# Patient Record
Sex: FEMALE | Race: WHITE | Employment: FULL TIME | URBAN - METROPOLITAN AREA
[De-identification: names, ages, dates, MRNs, and addresses within clinical notes are randomized per-mention and may not be internally consistent; named-entity substitution may affect disease eponyms.]

---

## 2022-06-15 PROBLEM — I48.19 PERSISTENT ATRIAL FIBRILLATION WITH RVR (HCC): Status: ACTIVE | Noted: 2022-06-15

## 2022-06-15 PROBLEM — Q24.9 CONGENITAL HEART DISEASE: Status: ACTIVE | Noted: 2022-06-15

## 2022-06-15 PROBLEM — I10 HTN (HYPERTENSION): Status: ACTIVE | Noted: 2022-06-15

## 2022-06-15 NOTE — PROGRESS NOTES
[address]   Electrophysiology Consultation   Date: 6/16/2022  Reason for Consultation: Congenital Heart, Atrial fibrillation  Consult Requesting Physician: Daisey Paget MD   Chief Complaint   Patient presents with    New Patient    Atrial Fibrillation    Hypertension       CC:  Atrial fibrillation   HPI: Benji Snell is a 52 y.o. female h/o Ebstein anomaly,Open heart surgery x 4,  Cadaver pulmonic valve replacement 1999, tricuspid valve replacement 2009. Referred by PCP for persistent atrial fibrillation. Family history of complex congenital Heart Disease. She is here for a second opinion. Interval History:   Francie Ferguson presents today for symptomatic atrial fibrillation. Flutter does not bother her as much . Per patient she states her groins are occluded. She was told this as a child at [facility]. He was supposed to have an ablation in 2009 at [facility] . Her prior surname is Rj. [id number]. Assessment and plan:   Persistent atrial flutter/possible fibrillation   - EKG today afib/flutter  rate 73    - she has been on most AAD . She had breakthrough episodes    - ekg 06/13/2022 - atrial fibrillation rate 59    - continue Lopressor 25 Mg BID- she takes an  Extra for high rates    - Patient has a [id number] Score of 1  ( gender )     ~ continue Warfarin   - Treatment options including cardioversion, rate control strategy, antiarrhythmics, anticoagulation and possible ablation were discussed with patient. Risks, benefits and alternative of each treatment options were explained. All questions answered   - she may be feeling the difference in rate rather than rhythm . Will have her wear a monitor    -The risks, benefits and alternatives of the ablation procedure were discussed with the patient.  The risks including, but not limited to, the risks of bleeding, infection, radiation exposure, injury to vascular, cardiac and surrounding structures (including pneumothorax), stroke, cardiac perforation, tamponade, need for emergent open heart surgery, need for pacemaker implantation, Injury to the phrenic nerve, injury to the esophagus, myocardial infarction and death were discussed in detail. There is a complicated case. 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 In addition she is very symptomatic with episodes of tachycardia that could be either atrial flutter with RVR or atrial fibrillation. My suspicion is that it is actually atrial flutter with rapid conduction that makes her symptomatic and not atrial fibrillation. I will do a monitor to assess for the type of rhythm that she has. My plan is to proceed with ablation of atrial flutter and may be atrial fibrillation if I find any evidence of it. This has to be done primarily from axis from the neck. However we will again attempt to see if there is any possibility of groin access at the time of procedure. Congential Heart Disease/ Valve replacement   - s/p pulmonic and valve replacement   - Ebstein anomaly     HTN  -Controlled 92/60   -BP goal <130/80  -Home BP monitoring encouraged, printed information provided on how to accurately measure BP at home.  -Counseled to follow a low salt diet to assure blood pressure remains controlled for cardiovascular risk factor modification.   -The patient is counseled to get regular exercise 3-5 times per week and maintain a healthy weight reduce cardiovascular risk factors. ~ lisinopril stopped due to heart rate          Plan:   Repeat monitor to determine rhythm and burden   Ablation of atrial flutter.     Patient Active Problem List    Diagnosis Date Noted    Edema 06/16/2022    Persistent atrial fibrillation with RVR ([address] 06/15/2022    HTN (hypertension) 06/15/2022    Congenital heart disease 06/15/2022     Diagnostic studies:   ECG 6/16/22  AFIB- RBBB rate 73    468 QTcH,  140 QRS    Echo 06/16/2022 pending            I independently reviewed the cardiac diagnostic studies, ECG and relevant imaging studies. No results found for: LVEF, LVEFMODE  No results found for: TSHFT4, TSH    Physical Examination:  Vitals:    06/16/22 1133   BP: 92/60   Pulse: 57   SpO2: 97%      Wt Readings from Last 3 Encounters:   06/16/22 134 lb (60.8 kg)   06/16/22 134 lb 14.4 oz (61.2 kg)       · Constitutional: Oriented. No distress. · Head: Normocephalic and atraumatic. · Mouth/Throat: Oropharynx is clear and moist.   · Eyes: Conjunctivae normal. EOM are normal.   · Neck: Neck supple. No rigidity. No JVD present. · Cardiovascular: Normal rate, regular rhythm, S1&S2. · Pulmonary/Chest: Bilateral respiratory sounds. No wheezes, No rhonchi. · Abdominal: Soft. Bowel sounds present. No distension, No tenderness. · Musculoskeletal: No tenderness. No edema    · Lymphadenopathy: Has no cervical adenopathy. · Neurological: Alert and oriented. Cranial nerve appears intact, No Gross deficit   · Skin: Skin is warm and dry. No rash noted. · Psychiatric: Has a normal behavior       Review of System:  [x] Full ROS obtained and negative except as mentioned in HPI    Prior to Admission medications    Medication Sig Start Date End Date Taking?  Authorizing Provider   metoprolol tartrate (LOPRESSOR) 25 MG tablet Take 25 mg by mouth 2 times daily  6/9/22  Yes Historical Provider, MD   warfarin (COUMADIN) 5 MG tablet  6/9/22  Yes Historical Provider, MD   Multiple Vitamins-Minerals (MULTI ADULT GUMMIES PO) Take by mouth   Yes Historical Provider, MD   Ascorbic Acid (VITAMIN C) 250 MG tablet Take 250 mg by mouth daily   Yes Historical Provider, MD   Cholecalciferol (VITAMIN D3) 125 MCG (5000 UT) TABS Take by mouth   Yes Historical Provider, MD   Coenzyme Q10 (COQ10) 100 MG CAPS Take by mouth   Yes Historical Provider, MD   Dimethicone-Allantoin (Amada Billow) Apply topically   Yes Historical Provider, MD   Lactobacillus (PROBIOTIC ACIDOPHILUS) CAPS Take by mouth   Yes Historical Provider, MD       Past Medical History:   Diagnosis Date    Atrial fibrillation ([address]     Atrial flutter (HCC)     HTN (hypertension)         Past Surgical History:   Procedure Laterality Date    PULMONARY VALVE REPLACEMENT         Allergies   Allergen Reactions    Compazine [Prochlorperazine] Other (See Comments)     Hallucinations     Sulfa Antibiotics Hives       Social History:  Reviewed. reports that she quit smoking about a year ago. Her smoking use included cigarettes. She has a 2.50 pack-year smoking history. She has never used smokeless tobacco. She reports current alcohol use of about 2.0 standard drinks of alcohol per week. She reports that she does not use drugs. Family History:  Reviewed. Reviewed. No family history of SCD. Relevant and available labs, and cardiovascular diagnostics reviewed. Reviewed. I independently reviewed all cardiac tracing. I independently reviewed relevant and available cardiac diagnostic tests ECG, CXR, Echo, Stress test, Device interrogation, Holter, CT scan. Outside medical records via Care everywhere reviewed and summarized in H&P above. Complex medical condition with multiple medical problems affecting prognosis and outcome of EP interventions       - The patient is counseled to follow a low salt diet to assure blood pressure remains controlled for cardiovascular risk factor modification.   - The patient is counseled to avoid excess caffeine, and energy drinks as this may exacerbated ectopy and arrhythmia. - The patient is counseled to get regular exercise 3-5 times per week to control cardiovascular risk factors. All questions and concerns were addressed to the patient/family. Alternatives to my treatment were discussed. I have discussed the above stated plan and the patient verbalized understanding and agreed with the plan. Scribe attestation:  This note was scribed in the

## 2022-06-15 NOTE — PROGRESS NOTES
[address]  6/16/22  Referring: Dr. Lauren Rosales CONSULT/CHIEF COMPLAINT/HPI     Reason for visit/ Chief complaint     Chief Complaint   Patient presents with    New Patient    Heart Problem     Congential        HPI Frank Flaherty is a 52 y.o. seen as a new patient. She has a complex cardiac history with congenital heart disease and 4 open heart surgeries. History of pulmonic valve w/ cadaver, replaced in 1999, tricuspid mechanical valve in 2009. She has chronic atrial fibrillation and has been cardioverted in past.     She also has an appt with Dr Marichuy Brian today. She states she has tried multiple anti-arrhythmic agents in the past. She states she has been in atrial fibrillation/flutter most of the time now. She states she feels bad when her heart rate is fast. Recently she has been taking extra Metoprolol for elevated HR. She has chest pain when heart is racing. Thien Kc denies MILLS, dizziness. She has edema in her LE's and abdomen. She has noted recently her belly is a lot more swollen and a lot more fatigue. This has gotten worse over the last month. Patient is adherent with medications and is tolerating them well without side effects     HISTORY/ALLERGIES/ROS     MedHx:  has a past medical history of Atrial fibrillation ([address] Atrial flutter ([address] and HTN (hypertension). SurgHx:  has a past surgical history that includes pulmonary valve replacement. SocHx:  reports that she quit smoking about a year ago. Her smoking use included cigarettes. She has a 2.50 pack-year smoking history. She has never used smokeless tobacco. She reports current alcohol use of about 2.0 standard drinks of alcohol per week. She reports that she does not use drugs.    FamHx: No family history of premature coronary artery disease, sudden death, or aneurysm  Allergies: Compazine [prochlorperazine] and Sulfa antibiotics     MEDICATIONS      Prior to Admission medications    Medication Sig Start Date End Date Taking? Authorizing Provider   cephALEXin (KEFLEX) 500 MG capsule  5/24/22  Yes Historical Provider, MD   lisinopril (PRINIVIL;ZESTRIL) 2.5 MG tablet  6/9/22  Yes Historical Provider, MD   metoprolol tartrate (LOPRESSOR) 25 MG tablet  6/9/22  Yes Historical Provider, MD   warfarin (COUMADIN) 5 MG tablet  6/9/22  Yes Historical Provider, MD       PHYSICAL EXAM        Vitals:    06/16/22 1034   BP: 92/60   Pulse: 57   Resp: 20   SpO2: 97%    Weight: 134 lb 14.4 oz (61.2 kg)     Gen Alert, cooperative, no distress Heart  Regular rate and rhythm, mechanical S1, fixed split S2, soft diastolic murmur   Head Normocephalic, atraumatic, no abnormalities Abd  Soft, NT, +BS, no mass, no OM. Mild abdominal distention, mild hepatomegaly, tender to palpation. Eyes PERRLA, conj/corn clear Ext  Ext nl, AT, no C/C, trace edema   Nose Nares normal, no drain age, Non-tender Pulse 2+ and symmetric   Throat Lips, mucosa, tongue normal Skin Color/text/turg nl, no rash/lesions   Neck S/S, TM, NT, no bruit. Moderate JVD. Psych Nl mood and affect   Lung CTA-B, unlabored, no DTP     Ch wall NT, no deform       LABS and Imaging     Relevant and available CV data reviewed    EKG personally interpreted: 5/25 Atrial fib BBB    Echo - new echo done today, personally reviewed/read by me, conclusions below:    Normal left ventricle size, wall thickness, and systolic function with an estimated ejection fraction of 50%. No regional wall motion  abnormalities are seen. Abnormal septal motion with sudden severe flattening of septum at start of diastole followed by a septal 'bounce'  The right ventricle is severely enlarged. Right ventricular systolic function is mildly reduced. Mild mitral regurgitation. The native tricuspid valve is apically displaced with no leaflet coaptation consistent with Ebstein's anomaly.   There is a mechanical tricuspid valve replacement at the same level of the mitral valve with a peak velocity of 2.5 m/s and a the near future, although it appears to have only moderate NH; however her RV is very large and the NH may be underestimated; regardless the valve is very old. She could have a percutaneous replacement via a hybrid approach, but more than likely will need redo sternotomy to replace both valves. She clinically has significant right heart failure with mild ascites, NYHA II, needs labs to evaluate for liver disease. These were ordered, as well as a BNP and other labs. 2.Tachycardia   Plan: per EP    3. PAF/flutter   Plan: check labs for BNP, CMP, CBC, and TSH. Per EP    4. Edema   Plan: HR is already fairly low and she is already on beta blocker to help lower TVR gradient. Will hold off on diuretics pending BNP results since her BP is already low. Patient counseled on lifestyle modification, diet, and exercise. Follow Up: 1 month     Lonnie Hay MD  Cardiologist [facility]    Scribe's attestation: This note was scribed in the presence of Dr Gilberto Cullen MD by Custer Bosworth, RN. Physician Attestation  The scribe wrote this note in the presence of me Lonnie Hay MD). The scribe may have prepopulated components of this note with my historical  intellectual property under my direct supervision. Any additions to this intellectual property were performed in my presence and at my direction. Furthermore, the content and accuracy of this note have been reviewed by me with edits by me as needed.   Lonnie Hay MD 6/16/2022 4:41 PM

## 2022-06-16 ENCOUNTER — OFFICE VISIT (OUTPATIENT)
Dept: CARDIOLOGY CLINIC | Age: 47
End: 2022-06-16
Payer: COMMERCIAL

## 2022-06-16 ENCOUNTER — HOSPITAL ENCOUNTER (OUTPATIENT)
Dept: NON INVASIVE DIAGNOSTICS | Age: 47
Discharge: HOME | End: 2022-06-16
Payer: COMMERCIAL

## 2022-06-16 VITALS
SYSTOLIC BLOOD PRESSURE: 92 MMHG | OXYGEN SATURATION: 97 % | HEART RATE: 57 BPM | DIASTOLIC BLOOD PRESSURE: 60 MMHG | HEIGHT: 61 IN | BODY MASS INDEX: 25.47 KG/M2 | WEIGHT: 134.9 LBS | RESPIRATION RATE: 20 BRPM

## 2022-06-16 VITALS
SYSTOLIC BLOOD PRESSURE: 92 MMHG | HEART RATE: 57 BPM | HEIGHT: 62 IN | WEIGHT: 134 LBS | DIASTOLIC BLOOD PRESSURE: 60 MMHG | OXYGEN SATURATION: 97 % | BODY MASS INDEX: 24.66 KG/M2

## 2022-06-16 DIAGNOSIS — I36.0 NONRHEUMATIC TRICUSPID VALVE STENOSIS: ICD-10-CM

## 2022-06-16 DIAGNOSIS — I48.19 PERSISTENT ATRIAL FIBRILLATION WITH RVR (HCC): Primary | ICD-10-CM

## 2022-06-16 DIAGNOSIS — I48.19 PERSISTENT ATRIAL FIBRILLATION WITH RVR (HCC): ICD-10-CM

## 2022-06-16 DIAGNOSIS — R60.9 EDEMA, UNSPECIFIED TYPE: ICD-10-CM

## 2022-06-16 DIAGNOSIS — I48.3 TYPICAL ATRIAL FLUTTER (HCC): ICD-10-CM

## 2022-06-16 DIAGNOSIS — Q24.9 CONGENITAL HEART DISEASE: ICD-10-CM

## 2022-06-16 DIAGNOSIS — I10 HYPERTENSION, UNSPECIFIED TYPE: ICD-10-CM

## 2022-06-16 DIAGNOSIS — Q24.9 CONGENITAL HEART DISEASE: Primary | ICD-10-CM

## 2022-06-16 DIAGNOSIS — E78.5 HYPERLIPIDEMIA, UNSPECIFIED HYPERLIPIDEMIA TYPE: ICD-10-CM

## 2022-06-16 LAB
LV EF: 50 %
LVEF MODALITY: NORMAL

## 2022-06-16 PROCEDURE — 93000 ELECTROCARDIOGRAM COMPLETE: CPT | Performed by: INTERNAL MEDICINE

## 2022-06-16 PROCEDURE — 93306 TTE W/DOPPLER COMPLETE: CPT

## 2022-06-16 PROCEDURE — 93246 EXT ECG>7D<15D RECORDING: CPT | Performed by: INTERNAL MEDICINE

## 2022-06-16 PROCEDURE — 99215 OFFICE O/P EST HI 40 MIN: CPT | Performed by: INTERNAL MEDICINE

## 2022-06-16 PROCEDURE — 99205 OFFICE O/P NEW HI 60 MIN: CPT | Performed by: INTERNAL MEDICINE

## 2022-06-16 RX ORDER — METOPROLOL TARTRATE 50 MG/1
50 TABLET, FILM COATED ORAL 2 TIMES DAILY
COMMUNITY
Start: 2022-06-09 | End: 2022-08-15 | Stop reason: SDUPTHER

## 2022-06-16 RX ORDER — CEPHALEXIN 500 MG/1
CAPSULE ORAL
COMMUNITY
Start: 2022-05-24 | End: 2022-06-16 | Stop reason: ALTCHOICE

## 2022-06-16 RX ORDER — MULTIVIT WITH MINERALS/LUTEIN
250 TABLET ORAL DAILY
COMMUNITY

## 2022-06-16 RX ORDER — CHOLECALCIFEROL (VITAMIN D3) 125 MCG
CAPSULE ORAL
COMMUNITY

## 2022-06-16 RX ORDER — LISINOPRIL 2.5 MG/1
TABLET ORAL
COMMUNITY
Start: 2022-06-09 | End: 2022-06-16

## 2022-06-16 RX ORDER — VITAMIN B COMPLEX
TABLET ORAL
COMMUNITY

## 2022-06-16 RX ORDER — WARFARIN SODIUM 5 MG/1
TABLET ORAL
COMMUNITY
Start: 2022-06-09

## 2022-06-16 NOTE — LETTER
[address]  EP Procedure Sheet    6/16/22  Riley Wallace  1975  EP Procedures  [] Pacemaker implant (single/dual) [] EP Study   [] ICD implant (single/dual) [x] Atrial flutter ablation (MARY Y/N)   [] Biv implant ICD [] Tilt Table   [] Biv implant PPM [x] Atrial fibrillation ablation (MARY Yes)  Neck access    [] Generator Change (PPM/ICD/BiV) [] SVT ablation   [] Lead revision (RV/LA/RA) (<1 month) [] PVC ablation     [] Lead extraction +/- upgrade (BiV/PPM/ICD) [] VT Ischemic/ non-ischemic   [] Loop implant/ removal [] VT RVOT   [] Cardioversion [] VT Left sided   [] MARY [] AVN ablation   Equipment  [] Medtronic  [] [employer] Mapping System   [] [place] [] [address]   [] [employer] [] CryoAblation   [] Biotronik [] Laser Lead Extraction   EP Procedures Scheduling Request  # hours Requested  []1 []2 []2-4 [x] 4-6 Scheduled  Date:   Specific Day  Completed    Anesthesia [x]yes []no F/u Date:   CT surgery backup []yes []no COVID     Overnight stay      Performing MD  []RMM [x]MXA   []MKW [] Other _______ First vs repeat  []1st [x] 2nd [] 3rd   Pre-Procedure Labs / Imaging  [] PT/INR [] Type & cross   [] CBC [] Units PRBC   [] BMP/Mg [] Units FFP   [] Venogram [] Cardiac CTA for Pulmonary vein mapping     RN INITIALS: DW    Patient Instructions  Do not eat or drink after midnight the night prior to procedure  Dx:persistent atrial fib/ flutter  ICD-10 code:  I48.2, i48.92    Medication Instructions:  Warfarin

## 2022-06-23 ENCOUNTER — TELEPHONE (OUTPATIENT)
Dept: CARDIOLOGY CLINIC | Age: 47
End: 2022-06-23

## 2022-06-23 NOTE — TELEPHONE ENCOUNTER
Millie Marmolejo called and wanted to [facility] know that [facility] Dr. Marco Antonio Cordova with [facility] regarding her condition and the cone procedure and to let him know that Dr. Marco Antonio Cordova will be calling him to discuss her medical history. She advised if any questions, to please call her.

## 2022-06-23 NOTE — TELEPHONE ENCOUNTER
Wonderful! That would be terrific if Dr. Jesica Lozada could do that on her. Thanks for letting me know.

## 2022-06-23 NOTE — TELEPHONE ENCOUNTER
Someone should be able to send the echo films via nucleus. I think [facility] uses Epic/Care Everywhere so the progress note and my echo report are both in [employer].

## 2022-06-23 NOTE — TELEPHONE ENCOUNTER
The person that pushes echos through is gone for the day and I am off on Friday. I wanted to forward to you so this does not get forgotten. Thanks!

## 2022-06-23 NOTE — TELEPHONE ENCOUNTER
All information faxed and received with confirmation  Received. Will check to see if they could still send a link for future purposes. Office is closed, will reach out first thing in the morning.

## 2022-06-23 NOTE — TELEPHONE ENCOUNTER
I have more information on [address]. Dr. Wilmer Conteh NP Thersia London called requesting Keke's last [address] notes with ANGIE. She also advised that if he would like to speak with her prior to talking to Dr. Laz Cotto she can be reached at [phone]. She asked that the Echo & OV notes be faxed to [phone]. She also mentioned that they usually send/receive records, test, etc electronically. The also use a system called [employer] and she can also provide a link to send the documents.

## 2022-06-24 NOTE — TELEPHONE ENCOUNTER
Call placed to [facility] who was not available for conversation. Needing to make sure that all information again was received and if there is anything they are needing from Dr Sarita Pagan. Lmor to call the office.

## 2022-06-28 NOTE — TELEPHONE ENCOUNTER
Will hold for Mrs Penny Keith or and Dr Aarti Mar. Spoke with Senia Stauffer in Echo she will make a copy of the disc. Holding for discussion.

## 2022-06-28 NOTE — TELEPHONE ENCOUNTER
Will speak with you about this in the morning. I do have the disk. They do not use the same system that we use. I have a link to send the images to. Prema stated that we cannot use the link it is a violation of the Hospital. I did speak with Abdiel Zafar and informed her that. She will overnight UPS packing to ship out the disc from [facility]. I will inform you more in the morning.

## 2022-06-28 NOTE — TELEPHONE ENCOUNTER
Ricky do they just want the disk sent or can they receive images through the cloud? If they would like it sent please see if they have  service and want to pick it up that way or if they want us to send it but it might take awhile.  Thank you

## 2022-06-28 NOTE — TELEPHONE ENCOUNTER
Latonya Mccollum, NP returning [employer] call, [facility], [place], [phone]. She's trying to get records on Pt.    Please advise

## 2022-07-07 ENCOUNTER — HOSPITAL ENCOUNTER (OUTPATIENT)
Dept: CARDIAC CATH/INVASIVE PROCEDURES | Age: 47
Discharge: HOME | End: 2022-07-07
Attending: INTERNAL MEDICINE | Admitting: INTERNAL MEDICINE
Payer: COMMERCIAL

## 2022-07-07 VITALS
SYSTOLIC BLOOD PRESSURE: 135 MMHG | BODY MASS INDEX: 25.3 KG/M2 | WEIGHT: 134 LBS | RESPIRATION RATE: 16 BRPM | HEIGHT: 61 IN | HEART RATE: 65 BPM | DIASTOLIC BLOOD PRESSURE: 81 MMHG | OXYGEN SATURATION: 97 %

## 2022-07-07 LAB
INR BLD: 2.8 (ref 0.88–1.12)
LV EF: 50 %
LVEF MODALITY: NORMAL

## 2022-07-07 PROCEDURE — 99153 MOD SED SAME PHYS/QHP EA: CPT

## 2022-07-07 PROCEDURE — 85610 PROTHROMBIN TIME: CPT

## 2022-07-07 PROCEDURE — 99152 MOD SED SAME PHYS/QHP 5/>YRS: CPT

## 2022-07-07 PROCEDURE — 93312 ECHO TRANSESOPHAGEAL: CPT

## 2022-07-07 PROCEDURE — 93325 DOPPLER ECHO COLOR FLOW MAPG: CPT

## 2022-07-07 PROCEDURE — 93320 DOPPLER ECHO COMPLETE: CPT

## 2022-07-07 PROCEDURE — 99152 MOD SED SAME PHYS/QHP 5/>YRS: CPT | Performed by: INTERNAL MEDICINE

## 2022-07-07 PROCEDURE — 7100000010 HC PHASE II RECOVERY - FIRST 15 MIN

## 2022-07-07 RX ORDER — SODIUM CHLORIDE 0.9 % (FLUSH) 0.9 %
5-40 SYRINGE (ML) INJECTION PRN
Status: DISCONTINUED | OUTPATIENT
Start: 2022-07-07 | End: 2022-07-07 | Stop reason: HOSPADM

## 2022-07-07 NOTE — PROGRESS NOTES
Brief immediate postprocedure note       MARY performed w/o complications      Anesthesia: 1% viscous lidocaine, cetacaine spray   Sedation: 4 mg IV versed   Analgesia: 100 mcg IV fentanyl     Probe inserted easily and removed easily   Findings: Mechanical tricuspid valve hard to see due to shadowing; severe turbulence through valve, although mean gradient 5-6. One leaflet moved well, other leaflet did not appear to move normally but was not well seen. No obvious thrombus noted. Severe \"smoke\" in right atrium, no atrial contractility. Negative bubble study      Complications: none   EBL: none   Specimens: N/A       CONSCIOUS SEDATION: Conscious sedation was given. The medication documented above was administered during the study. There was a trained observer present throughout the   entire time during which sedation was administered. Moderate sedation time   was 25 minutes.

## 2022-07-07 NOTE — PRE SEDATION
Sedation Pre-Procedure Note    Patient Name: Brannon Pelayo   YOB: 1975  Room/Bed: Cath/NONE  Medical Record Number: [id number]  Date: 7/7/2022   Time: 10:55 AM       Indication:  MARY    Consent: I have discussed with the patient and/or the patient representative the indication, alternatives, and the possible risks and/or complications of the planned procedure and the anesthesia methods. The patient and/or patient representative appear to understand and agree to proceed. Vital Signs:   Vitals:    07/07/22 1030   BP: 135/81   Pulse: 65   Resp: 16   SpO2: 97%       Past Medical History:   has a past medical history of Atrial fibrillation ([facility] Atrial flutter ([facility] and HTN (hypertension). Past Surgical History:   has a past surgical history that includes pulmonary valve replacement. Medications:   Scheduled Meds:   Continuous Infusions:   PRN Meds:   Home Meds:   Prior to Admission medications    Medication Sig Start Date End Date Taking?  Authorizing Provider   metoprolol tartrate (LOPRESSOR) 25 MG tablet Take 25 mg by mouth 2 times daily  6/9/22   Historical Provider, MD   warfarin (COUMADIN) 5 MG tablet  6/9/22   Historical Provider, MD   Multiple Vitamins-Minerals (MULTI ADULT GUMMIES PO) Take by mouth    Historical Provider, MD   Ascorbic Acid (VITAMIN C) 250 MG tablet Take 250 mg by mouth daily    Historical Provider, MD   Cholecalciferol (VITAMIN D3) 125 MCG (5000 UT) TABS Take by mouth    Historical Provider, MD   Coenzyme Q10 (COQ10) 100 MG CAPS Take by mouth    Historical Provider, MD   Dimethicone-Allantoin (COLLIGINIX EX) Apply topically    Historical Provider, MD   Lactobacillus (PROBIOTIC ACIDOPHILUS) CAPS Take by mouth    Historical Provider, MD     Coumadin Use Last 7 Days:  yes -   Antiplatelet drug therapy use last 7 days: no  Other anticoagulant use last 7 days: no  Additional Medication Information:  See above      Pre-Sedation Documentation and Exam:   I have personally completed a history, physical exam & review of systems for this patient (see notes).     Mallampati Airway Assessment:  normal    Prior History of Anesthesia Complications:   none    ASA Classification:  Class 2 - A normal healthy patient with mild systemic disease    Sedation/ Anesthesia Plan:   intravenous sedation    Medications Planned:   midazolam (Versed) intravenously and fentanyl intravenously    Patient is an appropriate candidate for plan of sedation: yes    Electronically signed by Clive Cruz MD on 7/7/2022 at 10:55 AM

## 2022-07-11 ENCOUNTER — TELEPHONE (OUTPATIENT)
Dept: CARDIOLOGY CLINIC | Age: 47
End: 2022-07-11

## 2022-07-11 PROCEDURE — 93248 EXT ECG>7D<15D REV&INTERPJ: CPT | Performed by: INTERNAL MEDICINE

## 2022-07-11 NOTE — TELEPHONE ENCOUNTER
Pt is calling to check on the her echo images to be sent to:   [address]  Fax:  [phone]  Attn:  Enmanuel Sloan    Please advise

## 2022-07-11 NOTE — TELEPHONE ENCOUNTER
Deloris with [facility] called asking to speak with Ricky. Please call her back at [phone] asap. Thank you.

## 2022-07-11 NOTE — TELEPHONE ENCOUNTER
The echo department is already working on this and I believe they have sent a disc to [facility]. Last I checked there wasn't any way to electronically send the images there.     Davi please confirm the address

## 2022-07-11 NOTE — TELEPHONE ENCOUNTER
Elizabeth Figueroa called me and asked if I could let Dr. Chapito Ivy know that she is tentatively scheduled for 7-20 for Surgery with [facility] in [place]. The are in need of the results of the Echo/MARY asap. Per another encounter, Mezzobit from [facility] also called requesting the results be sent.

## 2022-07-13 NOTE — TELEPHONE ENCOUNTER
All information of emails and link was given to Leydi who stated that she will speak with Cath lab and give then the link to send it.

## 2022-07-13 NOTE — TELEPHONE ENCOUNTER
Called  [facility] to speak with Hollice Fleischer [phone]. Left message with her asking her if she can call us to let us know if they have received the disc with echos.

## 2022-07-14 ENCOUNTER — TELEPHONE (OUTPATIENT)
Dept: CARDIOLOGY CLINIC | Age: 47
End: 2022-07-14

## 2022-07-14 NOTE — TELEPHONE ENCOUNTER
Patient's echo sent to [address] [place] [address] through [employer] overnight. Will email Leno Jain to let her know it has been sent.

## 2022-07-14 NOTE — TELEPHONE ENCOUNTER
Disc found in Dr Hermila Grayson folder. Overnighted to [facility] per MISAEL Holley.

## 2022-08-03 NOTE — PROGRESS NOTES
[address]  8/15/22  Referring: Dr. Pino Hogan CONSULT/CHIEF COMPLAINT/HPI     Reason for visit/ Chief complaint     Congenital heart disease       HPI Leonor Reyez is a 52 y.o. female here for 1 month follow up. She has a complex cardiac history with congenital heart disease and now 5 open heart surgeries. History of pulmonic valve w/ cadaver, replaced in 1999, tricuspid mechanical valve in 2009. She has chronic atrial fibrillation and has been cardioverted in past.     She is most recently s/p re-do sternotomy by Dr. Teresita Murray at [facility] with removal of thrombosed mechanical valve and successful repair of native Ebstein's valve via the [facility] repair. This was done about a month ago. She had a small residual pericardial effusion on last echo. Today she still is having palpitations. She has an appointment with Dr Tiara Mederos for an ablation 9/14/22. She continues on Coumadin. She had a flutter ablation during her surgery at [facility] but was found to be in flutter post-op with an adenosine challenge. Patient is adherent with medications and is tolerating them well without side effects     HISTORY/ALLERGIES/ROS     MedHx:  has a past medical history of Atrial fibrillation ([address] and Atrial flutter ([address] SurgHx:  has a past surgical history that includes pulmonary valve replacement and pulmonary valve repair. SocHx:  reports that she quit smoking about 13 months ago. Her smoking use included cigarettes. She has a 2.50 pack-year smoking history. She has never used smokeless tobacco. She reports current alcohol use of about 2.0 standard drinks per week. She reports that she does not use drugs. FamHx: No family history of premature coronary artery disease, sudden death, or aneurysm  Allergies: Compazine [prochlorperazine], Sulfa antibiotics, and Quinidine sulfate     MEDICATIONS      Prior to Admission medications    Medication Sig Start Date End Date Taking?  Authorizing Provider   oxyCODONE 5 MG capsule Take 5 mg by mouth every 4 hours as needed for Pain. Yes Historical Provider, MD   famotidine (PEPCID) 20 MG tablet Take 20 mg by mouth in the morning and 20 mg before bedtime. Yes Historical Provider, MD   colchicine (COLCRYS) 0.6 MG tablet Take 0.6 mg by mouth in the morning. Yes Historical Provider, MD   acetaminophen (TYLENOL) 500 MG tablet Take 1,000 mg by mouth every 6 hours as needed for Pain   Yes Historical Provider, MD   metoprolol tartrate (LOPRESSOR) 50 MG tablet Take 1 tablet by mouth in the morning and 1 tablet before bedtime. 8/15/22  Yes Valencia Johnson MD   diazePAM (VALIUM) 2 MG tablet Take 1 mg by mouth every 6 hours as needed for Anxiety. Yes Historical Provider, MD   warfarin (COUMADIN) 5 MG tablet  6/9/22  Yes Historical Provider, MD   Multiple Vitamins-Minerals (MULTI ADULT GUMMIES PO) Take by mouth   Yes Historical Provider, MD   Ascorbic Acid (VITAMIN C) 250 MG tablet Take 250 mg by mouth daily   Yes Historical Provider, MD   Cholecalciferol (VITAMIN D3) 125 MCG (5000 UT) TABS Take by mouth   Yes Historical Provider, MD   Lactobacillus (PROBIOTIC ACIDOPHILUS) CAPS Take by mouth   Yes Historical Provider, MD   Coenzyme Q10 (COQ10) 100 MG CAPS Take by mouth  Patient not taking: Reported on 8/15/2022    Historical Provider, MD   Dimethicone-Allantoin (Pipo Medici) Apply topically    Historical Provider, MD       PHYSICAL EXAM        Vitals:    08/15/22 0939   BP: 100/62   Pulse: 85   SpO2: 98%      Weight: 126 lb 6.4 oz (57.3 kg)     Gen Alert, cooperative, no distress Heart  Regular rate and rhythm, normal S1 S2, no murmur   Head Normocephalic, atraumatic, no abnormalities Abd  Soft, NT, +BS, no mass, no OM. Mild abdominal distention, mild hepatomegaly, tender to palpation.    Eyes PERRLA, conj/corn clear Ext  Ext nl, AT, no C/C, trace edema   Nose Nares normal, no drain age, Non-tender Pulse 2+ and symmetric   Throat Lips, mucosa, tongue normal Skin Color/text/turg nl, no rash/lesions   Neck S/S, TM, NT, no bruit. Moderate JVD. Psych Nl mood and affect   Lung CTA-B, unlabored, no DTP     Ch wall NT, no deform. Well-healing median sternotomy scar. LABS and Imaging     Relevant and available CV data reviewed    EKG personally interpreted: 5/25 Atrial fib BBB    MARY 7/7/22   Probable thrombosis of mechanical On-X tricuspid valve. Only one disc is   seen, and the disc seen has reduced motion. Severe tricuspid stenosis by Doppler   Markedly dilated RA with spontaneous contrast.   RV severely enlarged with mildly reduced function. Pulmonary homograft not well seen, but suspect severe WV given degree of RV   dilation. Left-sided chambers normal in size and function. Echo - 7/7/22, personally reviewed/read by me, conclusions below:  Normal left ventricle size, wall thickness, and systolic function with an estimated ejection fraction of 50%. No regional wall motion  abnormalities are seen. Abnormal septal motion with sudden severe flattening of septum at start of diastole followed by a septal 'bounce'  The right ventricle is severely enlarged. Right ventricular systolic function is mildly reduced. Mild mitral regurgitation. The native tricuspid valve is apically displaced with no leaflet coaptation consistent with Ebstein's anomaly. There is a mechanical tricuspid valve replacement at the same level of the mitral valve with a peak velocity of 2.5 m/s and a mean  gradient of 15 mmHg consistent with severe prosthetic valve stenosis. There are increased velocities through the TVR suggestive of prosthetic thickenening vs pannus. Mild tricuspid regurgitation with a PASP of 34 mmHg. s/p homograft PVR with no significant PS and at least moderate pulmonic regurgitation. Possible thrombus vs artifact visualized in the right atrium.   Rhythm during study: atrial flutter  Recommend MARY to evaluate mechanism of TVR stenosis in more detail and to rule out RA thrombus. Consider cardiac MR or CT to  further quantitate pulmonary valve regurgitation given that the homograft is > 21years old and degree of LA is likely underestimated  given size of RV. Stress: none    Cath: unknown    14 Day Holter: 100% Atrial fibrillation/ Flutter, rate is controlled, 1 NSVT 3 beats, symptoms with Afib rate controlled    Outside/Care everywhere records Reviewed  Labs Reviewed  Prior Imaging, ekg, cath, echo reviewed when available  Medications reviewed  Old Notes reviewed  ASSESSMENT AND PLAN     1. Congenital heart disease, Ebstein's, s/p mechanical TR and homograft PVR, now s/p mechanical valve explant, [facility] procedure and re-do PVR at [facility] in 7/2022. Plan:   Repeat echo to reevaluate her new valve repair and residual effusion  Continue metoprolol  Continue coumadin; no longer needs coumadin for her valve, but instead just for her fib/flutter so INR target should be lower 2-3  Has scheduled AF/AFl ablation via neck access with Dr. Fredo Pérez 9/14    After ablation may potentially be able to come off Coumadin now that her valve has been fixed    2. PAF/flutter   Plan: 9/14/22 0800 scheduled for ablation with Dr Amber Arreaga    3. Edema   Improving since surgery    Patient counseled on lifestyle modification, diet, and exercise. Follow Up: 9 months    Suzan Tinoco MD  Cardiologist Paz Espinozae's attestation: Note scribed by Sophia Whittaker RN, in the presence of Dr. Sloan Bourgeois wrote this note in the presence of me Suzan Tinoco MD). The scribe may have prepopulated components of this note with my historical  intellectual property under my direct supervision. Any additions to this intellectual property were performed in my presence and at my direction. Furthermore, the content and accuracy of this note have been reviewed by me with edits by me as needed.   Suzan Tinoco MD 8/15/2022 10:35 AM

## 2022-08-09 NOTE — BRIEF OP NOTE
Brief Postoperative Note      Patient: Karol Willingham  YOB: 1975  MRN: [id number]    Consent obtained prior to procedure. Flouroscopy of the mechanical tricuspid valve was performed. 1 minute of flouro time. Findings bileaflet mechanical valve visualized in multiple projections. It appears that Single leaflet fixed in position and 2nd leaflet has restriction motion. Plan: defer to Dr. Sarita Pagan and who will refer to CT surgery at [facility].       Electronically signed by Dg Santacruz MD on 8/9/2022 at 4:33 PM

## 2022-08-15 ENCOUNTER — HOSPITAL ENCOUNTER (OUTPATIENT)
Dept: NON INVASIVE DIAGNOSTICS | Age: 47
Discharge: HOME | End: 2022-08-15
Payer: COMMERCIAL

## 2022-08-15 ENCOUNTER — OFFICE VISIT (OUTPATIENT)
Dept: CARDIOLOGY CLINIC | Age: 47
End: 2022-08-15
Payer: COMMERCIAL

## 2022-08-15 VITALS
SYSTOLIC BLOOD PRESSURE: 100 MMHG | HEIGHT: 61 IN | OXYGEN SATURATION: 98 % | BODY MASS INDEX: 23.86 KG/M2 | WEIGHT: 126.4 LBS | DIASTOLIC BLOOD PRESSURE: 62 MMHG | HEART RATE: 85 BPM

## 2022-08-15 DIAGNOSIS — Z98.890 S/P TRICUSPID VALVE REPAIR: ICD-10-CM

## 2022-08-15 DIAGNOSIS — I48.3 TYPICAL ATRIAL FLUTTER (HCC): ICD-10-CM

## 2022-08-15 DIAGNOSIS — R00.2 PALPITATIONS: ICD-10-CM

## 2022-08-15 DIAGNOSIS — Q24.9 CONGENITAL HEART DISEASE: Primary | ICD-10-CM

## 2022-08-15 DIAGNOSIS — Q22.5 EBSTEIN ANOMALY: ICD-10-CM

## 2022-08-15 DIAGNOSIS — I48.19 PERSISTENT ATRIAL FIBRILLATION WITH RVR (HCC): ICD-10-CM

## 2022-08-15 DIAGNOSIS — I10 HYPERTENSION, UNSPECIFIED TYPE: ICD-10-CM

## 2022-08-15 PROCEDURE — 93306 TTE W/DOPPLER COMPLETE: CPT

## 2022-08-15 PROCEDURE — 93325 DOPPLER ECHO COLOR FLOW MAPG: CPT

## 2022-08-15 PROCEDURE — 93321 DOPPLER ECHO F-UP/LMTD STD: CPT

## 2022-08-15 PROCEDURE — 99214 OFFICE O/P EST MOD 30 MIN: CPT | Performed by: INTERNAL MEDICINE

## 2022-08-15 PROCEDURE — 93308 TTE F-UP OR LMTD: CPT

## 2022-08-15 PROCEDURE — 93000 ELECTROCARDIOGRAM COMPLETE: CPT | Performed by: INTERNAL MEDICINE

## 2022-08-15 RX ORDER — OXYCODONE HYDROCHLORIDE 5 MG/1
5 CAPSULE ORAL EVERY 4 HOURS PRN
COMMUNITY

## 2022-08-15 RX ORDER — METOPROLOL TARTRATE 50 MG/1
50 TABLET, FILM COATED ORAL 2 TIMES DAILY
Qty: 180 TABLET | Refills: 3 | Status: ON HOLD | OUTPATIENT
Start: 2022-08-15 | End: 2022-09-14 | Stop reason: SDUPTHER

## 2022-08-15 RX ORDER — DIAZEPAM 2 MG/1
1 TABLET ORAL EVERY 6 HOURS PRN
COMMUNITY

## 2022-08-15 RX ORDER — FAMOTIDINE 20 MG/1
20 TABLET, FILM COATED ORAL 2 TIMES DAILY
COMMUNITY

## 2022-08-15 RX ORDER — ACETAMINOPHEN 500 MG
1000 TABLET ORAL EVERY 6 HOURS PRN
COMMUNITY

## 2022-08-15 RX ORDER — COLCHICINE 0.6 MG/1
0.6 TABLET ORAL DAILY
COMMUNITY

## 2022-08-15 NOTE — LETTER
[facility] CARDIOLOGY-[address]  Dept: [phone]  Dept Fax: [phone]      August 15, 2022    Shira Raymond  : 1975  DOS: 8/15/2022    To Whom it May Concern:    Polo Guerrero has been seen in our office today and may return to work on . Please let my office know if you have any questions or concerns.           Rebecca Sabillon MD

## 2022-08-16 ENCOUNTER — TELEPHONE (OUTPATIENT)
Dept: CARDIOLOGY CLINIC | Age: 47
End: 2022-08-16

## 2022-08-16 NOTE — TELEPHONE ENCOUNTER
Vandana Pama works with Dr. Brady Brian in the [facility] is calling to see how the appt went with the Pt. And to check on the echo to see if there's infusion. Tabatha Sharp is requesting for WAK  to call him [phone].   Please advise

## 2022-08-16 NOTE — TELEPHONE ENCOUNTER
Spoke with Frandy MALDONADO whom works for Dr Len Bridges at [facility] and advised him of Dr Satnam Umaña message. I offered to fax the echo results to him that the patient had done here on her OV. He said that would be great. He is going to fax us the records from her procedure.

## 2022-09-13 NOTE — TELEPHONE ENCOUNTER
Dr. Katelyn fraga/[facility] in [facility] [facility] is asking to speak with Dr. Indu Mckee as soon as possible. Please call him on his cell @ [phone]. Thank you.

## 2022-09-14 ENCOUNTER — TELEPHONE (OUTPATIENT)
Dept: CARDIOLOGY CLINIC | Age: 47
End: 2022-09-14

## 2022-09-14 ENCOUNTER — ANESTHESIA EVENT (OUTPATIENT)
Dept: CARDIAC CATH/INVASIVE PROCEDURES | Age: 47
End: 2022-09-14
Payer: COMMERCIAL

## 2022-09-14 ENCOUNTER — HOSPITAL ENCOUNTER (OUTPATIENT)
Dept: CARDIAC CATH/INVASIVE PROCEDURES | Age: 47
Discharge: HOME | End: 2022-09-14
Attending: INTERNAL MEDICINE | Admitting: INTERNAL MEDICINE
Payer: COMMERCIAL

## 2022-09-14 ENCOUNTER — ANESTHESIA (OUTPATIENT)
Dept: CARDIAC CATH/INVASIVE PROCEDURES | Age: 47
End: 2022-09-14
Payer: COMMERCIAL

## 2022-09-14 VITALS
TEMPERATURE: 97 F | HEART RATE: 74 BPM | WEIGHT: 134 LBS | BODY MASS INDEX: 25.3 KG/M2 | DIASTOLIC BLOOD PRESSURE: 49 MMHG | HEIGHT: 61 IN | OXYGEN SATURATION: 98 % | SYSTOLIC BLOOD PRESSURE: 92 MMHG | RESPIRATION RATE: 12 BRPM

## 2022-09-14 DIAGNOSIS — I36.0 NONRHEUMATIC TRICUSPID VALVE STENOSIS: ICD-10-CM

## 2022-09-14 DIAGNOSIS — R06.02 SOB (SHORTNESS OF BREATH): Primary | ICD-10-CM

## 2022-09-14 PROBLEM — I48.4 ATYPICAL ATRIAL FLUTTER (HCC): Status: ACTIVE | Noted: 2022-09-14

## 2022-09-14 LAB
ABO/RH: NORMAL
ANION GAP SERPL CALCULATED.3IONS-SCNC: 7 MMOL/L (ref 3–16)
ANTIBODY SCREEN: NORMAL
BUN BLDV-MCNC: 16 MG/DL (ref 7–20)
CALCIUM SERPL-MCNC: 9.9 MG/DL (ref 8.3–10.6)
CHLORIDE BLD-SCNC: 102 MMOL/L (ref 99–110)
CO2: 28 MMOL/L (ref 21–32)
CREAT SERPL-MCNC: 0.7 MG/DL (ref 0.6–1.1)
EKG ATRIAL RATE: 98 BPM
EKG DIAGNOSIS: NORMAL
EKG P-R INTERVAL: 216 MS
EKG Q-T INTERVAL: 360 MS
EKG QRS DURATION: 160 MS
EKG QTC CALCULATION (BAZETT): 459 MS
EKG R AXIS: 79 DEGREES
EKG T AXIS: 268 DEGREES
EKG VENTRICULAR RATE: 98 BPM
GFR AFRICAN AMERICAN: >60
GFR NON-AFRICAN AMERICAN: >60
GLUCOSE BLD-MCNC: 99 MG/DL (ref 70–99)
HCT VFR BLD CALC: 41.1 % (ref 36–48)
HEMOGLOBIN: 13.9 G/DL (ref 12–16)
LV EF: 33 %
LVEF MODALITY: NORMAL
MCH RBC QN AUTO: 30.6 PG (ref 26–34)
MCHC RBC AUTO-ENTMCNC: 33.9 G/DL (ref 31–36)
MCV RBC AUTO: 90.1 FL (ref 80–100)
PDW BLD-RTO: 14.6 % (ref 12.4–15.4)
PLATELET # BLD: 240 K/UL (ref 135–450)
PMV BLD AUTO: 8 FL (ref 5–10.5)
POTASSIUM SERPL-SCNC: 3.2 MMOL/L (ref 3.5–5.1)
RBC # BLD: 4.56 M/UL (ref 4–5.2)
SODIUM BLD-SCNC: 137 MMOL/L (ref 136–145)
WBC # BLD: 8.1 K/UL (ref 4–11)

## 2022-09-14 PROCEDURE — 93005 ELECTROCARDIOGRAM TRACING: CPT | Performed by: INTERNAL MEDICINE

## 2022-09-14 PROCEDURE — C1732 CATH, EP, DIAG/ABL, 3D/VECT: HCPCS

## 2022-09-14 PROCEDURE — 2580000003 HC RX 258: Performed by: NURSE ANESTHETIST, CERTIFIED REGISTERED

## 2022-09-14 PROCEDURE — 93325 DOPPLER ECHO COLOR FLOW MAPG: CPT

## 2022-09-14 PROCEDURE — 2500000003 HC RX 250 WO HCPCS: Performed by: NURSE ANESTHETIST, CERTIFIED REGISTERED

## 2022-09-14 PROCEDURE — 86850 RBC ANTIBODY SCREEN: CPT

## 2022-09-14 PROCEDURE — 2580000003 HC RX 258

## 2022-09-14 PROCEDURE — C1894 INTRO/SHEATH, NON-LASER: HCPCS

## 2022-09-14 PROCEDURE — 7100000000 HC PACU RECOVERY - FIRST 15 MIN

## 2022-09-14 PROCEDURE — 86901 BLOOD TYPING SEROLOGIC RH(D): CPT

## 2022-09-14 PROCEDURE — 93653 COMPRE EP EVAL TX SVT: CPT | Performed by: INTERNAL MEDICINE

## 2022-09-14 PROCEDURE — 85027 COMPLETE CBC AUTOMATED: CPT

## 2022-09-14 PROCEDURE — 6360000002 HC RX W HCPCS

## 2022-09-14 PROCEDURE — 6360000002 HC RX W HCPCS: Performed by: NURSE ANESTHETIST, CERTIFIED REGISTERED

## 2022-09-14 PROCEDURE — 85610 PROTHROMBIN TIME: CPT

## 2022-09-14 PROCEDURE — C1730 CATH, EP, 19 OR FEW ELECT: HCPCS

## 2022-09-14 PROCEDURE — A4216 STERILE WATER/SALINE, 10 ML: HCPCS

## 2022-09-14 PROCEDURE — 80048 BASIC METABOLIC PNL TOTAL CA: CPT

## 2022-09-14 PROCEDURE — 86900 BLOOD TYPING SEROLOGIC ABO: CPT

## 2022-09-14 PROCEDURE — 93320 DOPPLER ECHO COMPLETE: CPT

## 2022-09-14 PROCEDURE — 3700000001 HC ADD 15 MINUTES (ANESTHESIA)

## 2022-09-14 PROCEDURE — 93315 ECHO TRANSESOPHAGEAL: CPT

## 2022-09-14 PROCEDURE — 93010 ELECTROCARDIOGRAM REPORT: CPT | Performed by: INTERNAL MEDICINE

## 2022-09-14 PROCEDURE — 2709999900 HC NON-CHARGEABLE SUPPLY

## 2022-09-14 PROCEDURE — 93653 COMPRE EP EVAL TX SVT: CPT

## 2022-09-14 PROCEDURE — 3700000000 HC ANESTHESIA ATTENDED CARE

## 2022-09-14 PROCEDURE — 2500000003 HC RX 250 WO HCPCS

## 2022-09-14 PROCEDURE — 7100000001 HC PACU RECOVERY - ADDTL 15 MIN

## 2022-09-14 PROCEDURE — C1769 GUIDE WIRE: HCPCS

## 2022-09-14 PROCEDURE — 36005 INJECTION EXT VENOGRAPHY: CPT | Performed by: INTERNAL MEDICINE

## 2022-09-14 PROCEDURE — 36415 COLL VENOUS BLD VENIPUNCTURE: CPT

## 2022-09-14 RX ORDER — HYDROMORPHONE HCL 110MG/55ML
0.5 PATIENT CONTROLLED ANALGESIA SYRINGE INTRAVENOUS EVERY 5 MIN PRN
Status: DISCONTINUED | OUTPATIENT
Start: 2022-09-14 | End: 2022-09-15 | Stop reason: HOSPADM

## 2022-09-14 RX ORDER — ONDANSETRON 2 MG/ML
4 INJECTION INTRAMUSCULAR; INTRAVENOUS
Status: ACTIVE | OUTPATIENT
Start: 2022-09-14 | End: 2022-09-14

## 2022-09-14 RX ORDER — VANCOMYCIN HYDROCHLORIDE 1 G/20ML
INJECTION, POWDER, LYOPHILIZED, FOR SOLUTION INTRAVENOUS PRN
Status: DISCONTINUED | OUTPATIENT
Start: 2022-09-14 | End: 2022-09-14 | Stop reason: SDUPTHER

## 2022-09-14 RX ORDER — FENTANYL CITRATE 50 UG/ML
INJECTION, SOLUTION INTRAMUSCULAR; INTRAVENOUS PRN
Status: DISCONTINUED | OUTPATIENT
Start: 2022-09-14 | End: 2022-09-14 | Stop reason: SDUPTHER

## 2022-09-14 RX ORDER — SODIUM CHLORIDE 0.9 % (FLUSH) 0.9 %
5-40 SYRINGE (ML) INJECTION PRN
Status: DISCONTINUED | OUTPATIENT
Start: 2022-09-14 | End: 2022-09-15 | Stop reason: HOSPADM

## 2022-09-14 RX ORDER — SODIUM CHLORIDE 0.9 % (FLUSH) 0.9 %
5-40 SYRINGE (ML) INJECTION EVERY 12 HOURS SCHEDULED
Status: DISCONTINUED | OUTPATIENT
Start: 2022-09-14 | End: 2022-09-15 | Stop reason: HOSPADM

## 2022-09-14 RX ORDER — MEPERIDINE HYDROCHLORIDE 25 MG/ML
12.5 INJECTION INTRAMUSCULAR; INTRAVENOUS; SUBCUTANEOUS EVERY 5 MIN PRN
Status: DISCONTINUED | OUTPATIENT
Start: 2022-09-14 | End: 2022-09-15 | Stop reason: HOSPADM

## 2022-09-14 RX ORDER — LABETALOL HYDROCHLORIDE 5 MG/ML
10 INJECTION, SOLUTION INTRAVENOUS
Status: DISCONTINUED | OUTPATIENT
Start: 2022-09-14 | End: 2022-09-15 | Stop reason: HOSPADM

## 2022-09-14 RX ORDER — ACETAMINOPHEN 325 MG/1
650 TABLET ORAL EVERY 4 HOURS PRN
Status: DISCONTINUED | OUTPATIENT
Start: 2022-09-14 | End: 2022-09-15 | Stop reason: HOSPADM

## 2022-09-14 RX ORDER — METOPROLOL TARTRATE 50 MG/1
25 TABLET, FILM COATED ORAL 2 TIMES DAILY
Qty: 180 TABLET | Refills: 3 | Status: SHIPPED | OUTPATIENT
Start: 2022-09-14 | End: 2022-10-10

## 2022-09-14 RX ORDER — SODIUM CHLORIDE 9 MG/ML
INJECTION, SOLUTION INTRAVENOUS PRN
Status: DISCONTINUED | OUTPATIENT
Start: 2022-09-14 | End: 2022-09-15 | Stop reason: HOSPADM

## 2022-09-14 RX ORDER — HYDRALAZINE HYDROCHLORIDE 20 MG/ML
10 INJECTION INTRAMUSCULAR; INTRAVENOUS
Status: DISCONTINUED | OUTPATIENT
Start: 2022-09-14 | End: 2022-09-15 | Stop reason: HOSPADM

## 2022-09-14 RX ORDER — DEXAMETHASONE SODIUM PHOSPHATE 4 MG/ML
INJECTION, SOLUTION INTRA-ARTICULAR; INTRALESIONAL; INTRAMUSCULAR; INTRAVENOUS; SOFT TISSUE PRN
Status: DISCONTINUED | OUTPATIENT
Start: 2022-09-14 | End: 2022-09-14 | Stop reason: SDUPTHER

## 2022-09-14 RX ORDER — SODIUM CHLORIDE 9 MG/ML
INJECTION, SOLUTION INTRAVENOUS CONTINUOUS PRN
Status: DISCONTINUED | OUTPATIENT
Start: 2022-09-14 | End: 2022-09-14 | Stop reason: SDUPTHER

## 2022-09-14 RX ORDER — PROPOFOL 10 MG/ML
INJECTION, EMULSION INTRAVENOUS PRN
Status: DISCONTINUED | OUTPATIENT
Start: 2022-09-14 | End: 2022-09-14 | Stop reason: SDUPTHER

## 2022-09-14 RX ORDER — LIDOCAINE HYDROCHLORIDE 20 MG/ML
INJECTION, SOLUTION EPIDURAL; INFILTRATION; INTRACAUDAL; PERINEURAL PRN
Status: DISCONTINUED | OUTPATIENT
Start: 2022-09-14 | End: 2022-09-14 | Stop reason: SDUPTHER

## 2022-09-14 RX ORDER — ONDANSETRON 2 MG/ML
INJECTION INTRAMUSCULAR; INTRAVENOUS PRN
Status: DISCONTINUED | OUTPATIENT
Start: 2022-09-14 | End: 2022-09-14 | Stop reason: SDUPTHER

## 2022-09-14 RX ORDER — OXYCODONE HYDROCHLORIDE 5 MG/1
5 TABLET ORAL
Status: ACTIVE | OUTPATIENT
Start: 2022-09-14 | End: 2022-09-14

## 2022-09-14 RX ORDER — SUCCINYLCHOLINE CHLORIDE 20 MG/ML
INJECTION INTRAMUSCULAR; INTRAVENOUS PRN
Status: DISCONTINUED | OUTPATIENT
Start: 2022-09-14 | End: 2022-09-14 | Stop reason: SDUPTHER

## 2022-09-14 RX ADMIN — VANCOMYCIN HYDROCHLORIDE 1000 MG: 1 INJECTION, POWDER, LYOPHILIZED, FOR SOLUTION INTRAVENOUS at 08:40

## 2022-09-14 RX ADMIN — LIDOCAINE HYDROCHLORIDE 60 MG: 20 INJECTION, SOLUTION EPIDURAL; INFILTRATION; INTRACAUDAL; PERINEURAL at 08:26

## 2022-09-14 RX ADMIN — SUCCINYLCHOLINE CHLORIDE 120 MG: 20 INJECTION, SOLUTION INTRAMUSCULAR; INTRAVENOUS at 08:26

## 2022-09-14 RX ADMIN — FENTANYL CITRATE 50 MCG: 50 INJECTION, SOLUTION INTRAMUSCULAR; INTRAVENOUS at 11:24

## 2022-09-14 RX ADMIN — PHENYLEPHRINE HYDROCHLORIDE 100 MCG: 10 INJECTION INTRAVENOUS at 08:36

## 2022-09-14 RX ADMIN — PHENYLEPHRINE HYDROCHLORIDE 50 MCG/MIN: 10 INJECTION INTRAVENOUS at 08:36

## 2022-09-14 RX ADMIN — PHENYLEPHRINE HYDROCHLORIDE 100 MCG: 10 INJECTION INTRAVENOUS at 11:21

## 2022-09-14 RX ADMIN — ONDANSETRON 4 MG: 2 INJECTION INTRAMUSCULAR; INTRAVENOUS at 11:41

## 2022-09-14 RX ADMIN — PROPOFOL 120 MG: 10 INJECTION, EMULSION INTRAVENOUS at 08:26

## 2022-09-14 RX ADMIN — PHENYLEPHRINE HYDROCHLORIDE 100 MCG: 10 INJECTION INTRAVENOUS at 08:26

## 2022-09-14 RX ADMIN — DEXAMETHASONE SODIUM PHOSPHATE 8 MG: 4 INJECTION, SOLUTION INTRAMUSCULAR; INTRAVENOUS at 10:30

## 2022-09-14 RX ADMIN — PHENYLEPHRINE HYDROCHLORIDE 100 MCG: 10 INJECTION INTRAVENOUS at 09:46

## 2022-09-14 RX ADMIN — SODIUM CHLORIDE: 9 INJECTION, SOLUTION INTRAVENOUS at 08:10

## 2022-09-14 RX ADMIN — FENTANYL CITRATE 50 MCG: 50 INJECTION, SOLUTION INTRAMUSCULAR; INTRAVENOUS at 08:19

## 2022-09-14 ASSESSMENT — LIFESTYLE VARIABLES: SMOKING_STATUS: 0

## 2022-09-14 NOTE — ANESTHESIA POSTPROCEDURE EVALUATION
Department of Anesthesiology  Postprocedure Note    Patient: Kierra Beth  MRN: [id number]  [place]: 1975  Date of evaluation: 9/14/2022      Procedure Summary     Date: 09/14/22 Room / Location: [facility] Cath Lab; [facility] Echocardiography    Anesthesia Start: 0810 Anesthesia Stop: 1115    Procedure: ECHO/ABLATION WITH ANESTHESIA Diagnosis:       Unspecified atrial flutter      Chronic atrial fibrillation, unspecified    Scheduled Providers:  Responsible Provider: Maddi Alston MD    Anesthesia Type: general ASA Status: 4          Anesthesia Type: No value filed.     Imelda Phase I: Imelda Score: 8    Imelda Phase II:        Anesthesia Post Evaluation    Patient location during evaluation: PACU  Patient participation: complete - patient participated  Level of consciousness: awake and alert  Airway patency: patent  Nausea & Vomiting: no vomiting and no nausea  Complications: no  Cardiovascular status: hemodynamically stable  Respiratory status: acceptable  Hydration status: stable

## 2022-09-14 NOTE — DISCHARGE INSTRUCTIONS
ABLATION DISCHARGE INSTRUCTIONS:    Care of your puncture site:  Remove bandage 24 hours after the procedure. May shower in 24 hours but do not sit in a bathtub/pool of water for 5 days or until the wound is healed. Inspect the site daily and gently clean using soap and water while standing in the shower. Dry thoroughly and apply a Band-Aid that covers the entire site. Do not apply powder or lotion. Normal Observations:  Soreness or tenderness which may last one week. Mild oozing from the incision site. Possible bruising that could last 2 weeks. Activity:  You may resume driving 24 hours following the procedure. You may resume normal activity in 3 days or after the wound heals. Avoid lifting more than 10 pounds for 3 days or until the wound heals. Avoid strenuous exercise or activity for 1 week. Nutrition:  Regular diet  Drink at least 8 to 10 glasses of decaffeinated, non-alcoholic fluid for the next 24 hours to flush the x-ray dye used for your angiogram out of your body. Call your doctor immediately if your condition worsens, for any other concerns, for a follow-up appointment or if you experience any of the following:  Significant bleeding that does not stop after 10 minutes of applying firm pressure on the puncture site. Increased swelling on the groin or leg. Unusual pain, numbness, or tingling of the groin or down the leg. Any signs of infection such as: redness, yellow drainage at the site, swelling or pain.

## 2022-09-14 NOTE — TELEPHONE ENCOUNTER
Patient needs an echo the same day as her appt with berhane in November.  She drives two hours a way and needs it scheduled on same day  thanks

## 2022-09-14 NOTE — ANESTHESIA PRE PROCEDURE
Department of Anesthesiology  Preprocedure Note       Name:  Justin Cote   Age:  52 y.o.  :  1975                                          MRN:  [id number]         Date:  2022      Surgeon: * Surgery not found *    Procedure:     Medications prior to admission:   Prior to Admission medications    Medication Sig Start Date End Date Taking? Authorizing Provider   oxyCODONE 5 MG capsule Take 5 mg by mouth every 4 hours as needed for Pain. Historical Provider, MD   famotidine (PEPCID) 20 MG tablet Take 20 mg by mouth in the morning and 20 mg before bedtime. Historical Provider, MD   colchicine (COLCRYS) 0.6 MG tablet Take 0.6 mg by mouth in the morning. Historical Provider, MD   acetaminophen (TYLENOL) 500 MG tablet Take 1,000 mg by mouth every 6 hours as needed for Pain    Historical Provider, MD   metoprolol tartrate (LOPRESSOR) 50 MG tablet Take 1 tablet by mouth in the morning and 1 tablet before bedtime. 8/15/22   Julio Blanchard MD   diazePAM (VALIUM) 2 MG tablet Take 1 mg by mouth every 6 hours as needed for Anxiety. Historical Provider, MD   warfarin (COUMADIN) 5 MG tablet  22   Historical Provider, MD   Multiple Vitamins-Minerals (MULTI ADULT GUMMIES PO) Take by mouth    Historical Provider, MD   Ascorbic Acid (VITAMIN C) 250 MG tablet Take 250 mg by mouth daily    Historical Provider, MD   Cholecalciferol (VITAMIN D3) 125 MCG (5000 UT) TABS Take by mouth    Historical Provider, MD   Coenzyme Q10 (COQ10) 100 MG CAPS Take by mouth  Patient not taking: Reported on 8/15/2022    Historical Provider, MD   Dimethicone-Allantoin (Precilla Le) Apply topically    Historical Provider, MD   Lactobacillus (PROBIOTIC ACIDOPHILUS) CAPS Take by mouth    Historical Provider, MD       Current medications:    Current Outpatient Medications   Medication Sig Dispense Refill    oxyCODONE 5 MG capsule Take 5 mg by mouth every 4 hours as needed for Pain.       famotidine (PEPCID) 20 MG tablet Take 20 mg by mouth in the morning and 20 mg before bedtime.  colchicine (COLCRYS) 0.6 MG tablet Take 0.6 mg by mouth in the morning.  acetaminophen (TYLENOL) 500 MG tablet Take 1,000 mg by mouth every 6 hours as needed for Pain      metoprolol tartrate (LOPRESSOR) 50 MG tablet Take 1 tablet by mouth in the morning and 1 tablet before bedtime. 180 tablet 3    diazePAM (VALIUM) 2 MG tablet Take 1 mg by mouth every 6 hours as needed for Anxiety.  warfarin (COUMADIN) 5 MG tablet       Multiple Vitamins-Minerals (MULTI ADULT GUMMIES PO) Take by mouth      Ascorbic Acid (VITAMIN C) 250 MG tablet Take 250 mg by mouth daily      Cholecalciferol (VITAMIN D3) 125 MCG (5000 UT) TABS Take by mouth      Coenzyme Q10 (COQ10) 100 MG CAPS Take by mouth (Patient not taking: Reported on 8/15/2022)      Dimethicone-Allantoin (COLLIGINIX EX) Apply topically      Lactobacillus (PROBIOTIC ACIDOPHILUS) CAPS Take by mouth       No current facility-administered medications for this encounter. Allergies:     Allergies   Allergen Reactions    Compazine [Prochlorperazine] Other (See Comments)     Hallucinations     Sulfa Antibiotics Hives    Quinidine Sulfate Headaches, Nausea And Vomiting and Photosensitivity       Problem List:    Patient Active Problem List   Diagnosis Code    Persistent atrial fibrillation with RVR (HCC) I48.19    Congenital heart disease Q24.9    HTN (hypertension) I10    Edema R60.9       Past Medical History:        Diagnosis Date    Atrial fibrillation (HCC)     Atrial flutter (HCC)        Past Surgical History:        Procedure Laterality Date    PULMONARY VALVE REPLACEMENT      PULMONARY VALVE REPLACEMENT      22, tricuspid repair       Social History:    Social History     Tobacco Use    Smoking status: Former     Packs/day: 0.25     Years: 10.00     Pack years: 2.50     Types: Cigarettes     Quit date: 2021     Years since quittin.2    Smokeless tobacco: Never   Substance Use Topics    Alcohol use: Yes     Alcohol/week: 2.0 standard drinks     Types: 2 Cans of beer per week     Comment: soc                                Counseling given: Not Answered      Vital Signs (Current): There were no vitals filed for this visit. BP Readings from Last 3 Encounters:   08/15/22 100/62   07/07/22 135/81   06/16/22 92/60       NPO Status:                                                                                 BMI:   Wt Readings from Last 3 Encounters:   08/15/22 126 lb 6.4 oz (57.3 kg)   07/07/22 134 lb (60.8 kg)   06/16/22 134 lb (60.8 kg)     There is no height or weight on file to calculate BMI.    CBC: No results found for: WBC, RBC, HGB, HCT, MCV, RDW, PLT    CMP: No results found for: NA, K, CL, CO2, BUN, CREATININE, GFRAA, AGRATIO, LABGLOM, GLUCOSE, GLU, PROT, CALCIUM, BILITOT, ALKPHOS, AST, ALT    POC Tests: No results for input(s): POCGLU, POCNA, POCK, POCCL, POCBUN, POCHEMO, POCHCT in the last 72 hours.     Coags:   Lab Results   Component Value Date/Time    INR 2.80 07/07/2022 10:46 AM       HCG (If Applicable): No results found for: PREGTESTUR, PREGSERUM, HCG, HCGQUANT     ABGs: No results found for: PHART, PO2ART, WVZ5KCZ, RTC5KWQ, BEART, G7BOQRQG     Type & Screen (If Applicable):  No results found for: LABABO, LABRH    Drug/Infectious Status (If Applicable):  No results found for: HIV, HEPCAB    COVID-19 Screening (If Applicable): No results found for: COVID19        Anesthesia Evaluation  Patient summary reviewed and Nursing notes reviewed no history of anesthetic complications:   Airway: Mallampati: I  TM distance: >3 FB   Neck ROM: full  Mouth opening: > = 3 FB   Dental: normal exam         Pulmonary:normal exam  breath sounds clear to auscultation      (-) COPD, asthma, sleep apnea and not a current smoker (former)                           Cardiovascular:    (+) hypertension:, valvular problems/murmurs (CHD, Ebstein's, s/p mech TR and homograft PVR, [facility] and re[facility]do PVR at [facility] in 7/2022):, dysrhythmias (PAFib/flut, on bb and ac as outpt): atrial fibrillation and atrial flutter, murmur, pulmonary hypertension:,     (-) past MI, CAD, CABG/stent,  angina and  CHF (echo 8/22 EF 50, RV sev enlarged and RVSF mod reduced, mild TR, RVSP 47)    ECG reviewed  Rhythm: irregular  Rate: normal  Echocardiogram reviewed    Cleared by cardiology     Beta Blocker:  Dose within 24 Hrs         Neuro/Psych:   (+) depression/anxiety    (-) seizures, TIA and CVA           GI/Hepatic/Renal:   (+) GERD: well controlled,      (-) liver disease and no renal disease       Endo/Other: Negative Endo/Other ROS       (-) diabetes mellitus, hypothyroidism, hyperthyroidism               Abdominal:             Vascular: Other Findings:           Anesthesia Plan      general     ASA 4       Induction: intravenous. MIPS: Postoperative opioids intended and Prophylactic antiemetics administered. Anesthetic plan and risks discussed with patient. Plan discussed with CRNA.                     Raul Torres MD   9/14/2022

## 2022-09-14 NOTE — PROGRESS NOTES
Pt arrived from cath lab to PACU, awakens to voice, denies pain. VSS, O2 sats 100% on room air. Pt in NSR with HR in 70s. Dressing to right neck dry and intact. Bilateral groins soft. Left arm antecubital sheath in place being used as IV, left radial pulse palpable. Junior in place draining clear yellow urine. Will monitor.

## 2022-09-14 NOTE — PROGRESS NOTES
Pt resting quietly in bed, awake, denies pain. VSS, O2 sats 100% on room air. Pt in NSR with HR in 70s, BP 80s/40s, MAP in 60s. IVF bolus infusing, Dr. Agustín Maza notified of BP, no new orders received. Phase 1 criteria met. Will transfer pt back to cath lab for monitoring and discharge per orders.

## 2022-09-14 NOTE — PROGRESS NOTES
BP 80s/40s with MAPs in 50s. Dr. Christopher Albrecht notified, order received to give 500 mL NS bolus IV. Will implement and monitor.

## 2022-09-14 NOTE — H&P
[address]   Electrophysiology    Reason for Consultation: Congenital Heart, Atrial flutter  Consult Requesting Physician: Essie Gonsales MD   Chief Complaint   Patient presents with    New Patient    Atrial Fibrillation    Hypertension       CC:  Atrial flutter  HPI: Levi Herrera is a 52 y.o. female h/o Ebstein anomaly,Open heart surgery x 4,  Cadaver pulmonic valve replacement 1999, tricuspid valve replacement 2009. Referred by PCP for persistent atrial fibrillation. Family history of complex congenital Heart Disease. She is here for a second opinion. Interval History:    Flutter does not bother her as much . Per patient she states her groins are occluded. She was told this as a child at [facility]. He was supposed to have an ablation in 2009 at [facility] . Her prior surname is Rj. [id number]. 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  This happened on 7/20/2022. Patient has been in atrial flutter since. Assessment and plan:   Persistent atrial flutter/possible fibrillation   - EKG today afib/flutter  rate 73    - she has been on most AAD . She had breakthrough episodes    - ekg 06/13/2022 - atrial fibrillation rate 59    - continue Lopressor 25 Mg BID- she takes an  Extra for high rates    - Patient has a [id number] Score of 1  ( gender )     ~ continue Warfarin   - Treatment options including cardioversion, rate control strategy, antiarrhythmics, anticoagulation and possible ablation were discussed with patient. Risks, benefits and alternative of each treatment options were explained. All questions answered   - she may be feeling the difference in rate rather than rhythm .  Will have her wear a monitor    -The risks, benefits and alternatives of the ablation procedure were discussed with the patient. The risks including, but not limited to, the risks of bleeding, infection, radiation exposure, injury to vascular, cardiac and surrounding structures (including pneumothorax), stroke, cardiac perforation, tamponade, need for emergent open heart surgery, need for pacemaker implantation, Injury to the phrenic nerve, injury to the esophagus, myocardial infarction and death were discussed in detail. There is a complicated case. 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 In addition she is very symptomatic with episodes of tachycardia that could be either atrial flutter with RVR or atrial fibrillation. My suspicion is that it is actually atrial flutter with rapid conduction that makes her symptomatic and not atrial fibrillation. I will do a monitor to assess for the type of rhythm that she has. My plan is to proceed with ablation of atrial flutter and may be atrial fibrillation if I find any evidence of it. This has to be done primarily from axis from the neck. However we will again attempt to see if there is any possibility of groin access at the time of procedure. Congential Heart Disease/ Valve replacement   - s/p pulmonic and valve replacement   - Ebstein anomaly     HTN  -Controlled 92/60   -BP goal <130/80  -Home BP monitoring encouraged, printed information provided on how to accurately measure BP at home.  -Counseled to follow a low salt diet to assure blood pressure remains controlled for cardiovascular risk factor modification.   -The patient is counseled to get regular exercise 3-5 times per week and maintain a healthy weight reduce cardiovascular risk factors. ~ lisinopril stopped due to heart rate          Plan:      Ablation of atrial flutter.     Patient Active Problem List    Diagnosis Date Noted    Edema 06/16/2022    Persistent atrial fibrillation with RVR ([facility]) 06/15/2022    HTN (hypertension) 06/15/2022    Congenital heart disease 06/15/2022     Diagnostic studies:   ECG 6/16/22  AFIB- RBBB rate 73    468 QTcH,  140 QRS    Echo 06/16/2022 pending            I independently reviewed the cardiac diagnostic studies, ECG and relevant imaging studies. No results found for: LVEF, LVEFMODE  No results found for: TSHFT4, TSH    Physical Examination:  Vitals:    06/16/22 1133   BP: 92/60   Pulse: 57   SpO2: 97%      Wt Readings from Last 3 Encounters:   06/16/22 134 lb (60.8 kg)   06/16/22 134 lb 14.4 oz (61.2 kg)       Constitutional: Oriented. No distress. Head: Normocephalic and atraumatic. Mouth/Throat: Oropharynx is clear and moist.   Eyes: Conjunctivae normal. EOM are normal.   Neck: Neck supple. No rigidity. No JVD present. Cardiovascular: Normal rate, regular rhythm, S1&S2. Pulmonary/Chest: Bilateral respiratory sounds. No wheezes, No rhonchi. Abdominal: Soft. Bowel sounds present. No distension, No tenderness. Musculoskeletal: No tenderness. No edema    Lymphadenopathy: Has no cervical adenopathy. Neurological: Alert and oriented. Cranial nerve appears intact, No Gross deficit   Skin: Skin is warm and dry. No rash noted. Psychiatric: Has a normal behavior       Review of System:  [x] Full ROS obtained and negative except as mentioned in HPI    Prior to Admission medications    Medication Sig Start Date End Date Taking?  Authorizing Provider   metoprolol tartrate (LOPRESSOR) 25 MG tablet Take 25 mg by mouth 2 times daily  6/9/22  Yes Historical Provider, MD   warfarin (COUMADIN) 5 MG tablet  6/9/22  Yes Historical Provider, MD   Multiple Vitamins-Minerals (MULTI ADULT GUMMIES PO) Take by mouth   Yes Historical Provider, MD   Ascorbic Acid (VITAMIN C) 250 MG tablet Take 250 mg by mouth daily   Yes Historical Provider, MD   Cholecalciferol (VITAMIN D3) 125 MCG (5000 UT) TABS Take by mouth   Yes Historical Provider, MD   Coenzyme Q10 (COQ10) 100 MG CAPS

## 2022-09-14 NOTE — PROCEDURES
[address]     Electrophysiology Procedure Note       Date of Procedure: 9/14/2022  Patient's Name: Diane Morales  YOB: 1975   Medical Record Number: [id number]  Referring Physician: Evan De Los Santos  Procedure Performed by: Balwinder Day MD    Procedure performed:     Electrophysiology study with left atrial recording and mapping   3-D electroanatomical mapping of the left atrium and  right atium. 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  Patient also has bilateral lower extremity venous occlusion that prevented us from advancing any sheath to the groin. She has prosthetic tricuspid and pulmonic valve and post cardiac surgery. It was also difficult to advance any catheter from antecubital access. LV function and RV function were also reduced which increased risk of serious complications. Indications for procedure:   Symptomatic atrial flutter, failed antiarrhythmic therapy and cardioversion in the past   Diane Morales is a 52 y.o. female   Due to failure of antiarrhythmic therapy in the past, patient has been scheduled to have ablation for symptomatic atrial flutter    Details of Procedure: The risks, benefits and alternatives of the ablation procedure were discussed with the patient. The risks including, but not limited to, the risks of bleeding, infection, radiation exposure, injury to vascular, cardiac and surrounding structures (including pneumothorax), stroke, cardiac perforation, tamponade, need for emergent open heart surgery, need for pacemaker implantation, esophageal injury and fistula, myocardial infarction and death were discussed in detail. The patient opted to proceed with the ablation. Written informed consent was signed and placed in the chart. Patient was brought to the EP lab in a fasting non-sedate state. Patient underwent general anesthesia by anesthesia team. We initially performed a transesophageal echo that showed no left atrial appendage clot/thrombus. Ultrasound was used for femoral venous access. We gained access   modified Seldinger technique and ultrasound guidance. We were not able to advance the wire beyond certain point. Venography of the right femoral access showed a tortuous and convoluted vein which reconstituted later on to deep iliac vein. There was no obvious direct venous access that could get us into the IVC from the right. I then used the ultrasound and accessed the left with a micropuncture needle. Again venography did not show any viable access that could be used for sheath advancement and only occluded vein with multiple collaterals. Then we used ultrasound and gain access to right internal jugular vein. We initially also upgraded the left antecubital vein to a 6 [place] and try to advance the coronary sinus catheter into the right atrium. Although we were able to advance the catheter, we were not able to position it very well into coronary sinus. I used the neck access on the right to get into coronary sinus and use that as a guide for the antecubital axis catheter. However not very successful. Despite changing the decapolar catheter from medium curve to large curve we were not able to and later to advance the catheter through the subclavian and therefore abandoned this approach. Using 3-D mapping system Carto the CS cathter was placed inside the coronary sinus  for recording and mapping of the left atrium. Second access was achieved in the right IJ and using that we advanced the decapolar cath into coronary sinus. Using the Penta ray catheter we mapped the right atrium and voltage map and activation mapping was done.     This showed a very large and wide scar band from SVC to IVC in the lateral right atrium. There was an [place] of a scar in the mid septal posterior wall. There was low voltage areas between this area of his scar and tricuspid valve and also SVC. The cavotricuspid isthmus also seem to be scar which was consistent with the previously reported ablation of it during surgery. The activation map suggested a macro reentrant atrial tachycardia that involved the 2 areas of low voltage around the posterior septal scar tissue. Cycle length of tachycardia was 316 ms. Once this was done, we proceeded with ablation using 36 W and 10 to 15 seconds and an ablation index of 400. Application of radiofrequency lesions was done from the mid septal scar to the SVC which resulted in termination of the flutter. A full line and several other lesions to enforce the line were given. We confirmed the ablation by pacing at 10 A of output over the ablated area and lack of capture. Therefore the sheath that she had in her neck for a 6 [place] and an 5 [place]uv[place] short sheath both in right jugular vein. The deca polar/ablation catheter were moved from CS to right atrium,  and His bundle position. His bundle potentials was recorded and pacing was performed from right atrium, coronary sinus with the following results:     His recording could not be obtained due to prior surgery despite multiple attempts. Pacing from atrium, using CS catheter which was moved, showed 1:1 conduction over AV node with (AV wenckebach) was 530 msec  Pacing from atrium, AV kg ERP was 700/410 msec      Burst pacing did not induce any atrial arrhythmias. We did not use isoproterenol. Hemostasis was achieved by manual compression on all sites. The patient tolerated the procedure well and there were no complications. Patient was extubated and transferred to the floor in stable condition.      Blood loss <72 cc  No complication  Conclusion: -Successful ablation of macro reentrant scar dependent right atrial flutter      Plan:   The patient will be observed for a few hours and discharged home if   stable   Patient will receive usual post ablation care. She will need repeat echocardiogram to assess the LV and RV function.     Dennise Councilman, MD,   [facility]   Office: [phone]

## 2022-09-15 DIAGNOSIS — Z98.890 STATUS POST ABLATION OF ATRIAL FIBRILLATION: ICD-10-CM

## 2022-09-15 DIAGNOSIS — I48.91 ATRIAL FIBRILLATION, UNSPECIFIED TYPE (HCC): Primary | ICD-10-CM

## 2022-09-15 DIAGNOSIS — Z86.79 STATUS POST ABLATION OF ATRIAL FIBRILLATION: ICD-10-CM

## 2022-09-16 LAB — INR BLD: 2 (ref 0.88–1.12)

## 2022-10-10 ENCOUNTER — TELEPHONE (OUTPATIENT)
Dept: CARDIOLOGY CLINIC | Age: 47
End: 2022-10-10

## 2022-10-10 NOTE — TELEPHONE ENCOUNTER
----- Message from Carlos Banks MD sent at 10/10/2022 10:41 AM EDT -----  Regarding: RE: Lopressor   Sure thing    Stormy Lizzeth can you send in a new Rx for the lower dose?    ----- Message -----  From: Minesh Barroso MA  Sent: 10/7/2022   6:09 PM EDT  To: Carlos Banks MD, Demetria Stahl RN  Subject: Brionna Parra: Lopressor                                      ----- Message -----  From: Edna Bender  Sent: 10/7/2022  12:15 PM EDT  To: [facility] Practice Staff  Subject: Mayi Mahan,     My Lopressor is 50mg which I split to 25mg however recently my dose has dropped to 12.5. Can a script be sent in for 25mg tablets?       Thank you,     Shasha Ivan

## 2022-10-10 NOTE — TELEPHONE ENCOUNTER
I attempted to call patient to let her know that Dr Rose Mary Graham was filling her Metoprolol 12.5 mg dose and I would send it in to the [address] listed on file, I went over the address for the pharmacy on the message and let the patient know to call us back if it needed to be sent to a different pharmacy.

## 2022-11-10 NOTE — PROGRESS NOTES
[facility]   Electrophysiology Follow Up  Date: 11/16/2022    CC: Aflutter   HPI: Sandi Quick is a 52 y.o. female h/o Ebstein anomaly,Open heart surgery x 4,  Cadaver pulmonic valve replacement 1999, tricuspid valve replacement 2009. Referred by PCP for persistent atrial fibrillation. Family history of complex congenital Heart Disease. She is here for a second opinion. 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 Selective venography of lower extremity, bilateral and left upper extremity    Interval History:   Her prior surname is Rj. [id number]. Rissa Rivas presents to the office in follow up. She states that she felt like she was out of rhythm yesterday. She is in atrial flutter per ECG today. States that she has been aware of a heart rate between  bpm for the past month. Has been increasing her activity and has been speed walking with her mom. Yesterday she felt a little woozy and had pressure in her head and noticed her HR was approximately 175 BPM but it resolved quickly. 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 - ECG 06/13/2022 - atrial fibrillation rate 59    - continue Lopressor 12.5 Mg but increase to twice a day   - Patient has a [id number] Score of 1  ( gender )     ~ continue Warfarin   -Discussed treatment options including doing a DCCV vs completing another ablation.        -Will proceed with DCCV     She had recurrence of atrial flutter likely about a month or more after her ablation. She does not endorse any significant change in symptoms after the ablation or after recurrence. The heart rate has been on the faster side. We will increase her metoprolol to twice a day.   I think we can proceed with a cardioversion and if she has recurrence after that, we can consider another ablation. My concern is that depending on the flutter circuit, there is chances of blocking all the conduction avenues in the right atrium causing interatrial dissociation. However we will assess that at the time of EP study if necessary. It is a possibility that she may end up with a pacemaker in the future. Congential Heart Disease/ Valve replacement/LV dysfunction   - s/p pulmonic and valve replacement   - Ebstein anomaly     Her LV function is reduced. EF is 41%. I reviewed all her echoes and I think more or less the EF has remained about the same with a slight variation depending on the rate at the time of the study. Due to low blood pressure, management of LV dysfunction has been difficult. HTN  -Controlled 104/80  -BP goal <130/80  -Home BP monitoring encouraged, printed information provided on how to accurately measure BP at home.  -Counseled to follow a low salt diet to assure blood pressure remains controlled for cardiovascular risk factor modification.   -The patient is counseled to get regular exercise 3-5 times per week and maintain a healthy weight reduce cardiovascular risk factors. Continue same management. Plan:   Schedule DCCV    Patient Active Problem List    Diagnosis Date Noted    Nonrheumatic tricuspid valve stenosis 09/14/2022    Atypical atrial flutter ([address] 09/14/2022    Edema 06/16/2022    Persistent atrial fibrillation with RVR ([address] 06/15/2022    HTN (hypertension) 06/15/2022    Congenital heart disease 06/15/2022     Diagnostic studies:   ECG 11/16/22  Atrial Flutter  QTcH 445,    Echo 11/16/2022   Normal left ventricular wall thickness. Normal left ventricular cavity size. Mild to moderately diminished left ventricular systolic function with an   estimated ejection fraction of 40%.  Abnormal septal motion likely due to   prior cardiac surgery and right bundle branch block.   Indeterminate diastolic function. The right ventricle is severely dilated. RV systolic function is moderately   to severely depressed. The left atrium is mild to moderately dilated. The right atrium is severely dilated. The mitral valve is thickened with adequate excursion. Mild to moderate   mitral regurgitation. Patient s/p cone procedure for Ebstein's anomaly. Flow through repaired   tricuspid valve appears within normal limits with a tricuspid valve PV of   1.53 m/s and MPG of 3-4 mmHg. Mild to moderate tricuspid regurgitation with   an estimated RVSP of 45 mmHg; deducting mild gradient across pulmonic valve   estimated PASP is in the normal range. The bioprosthetic pulmonic valve appears well seated with a PV of 1.86 m/s   and MPG of 8 mmHg (expected range for this type of bioprosthesis). No   paravalvular or intravalvular leaks visualized. The inferior vena cava is dilated with <50% respiratory variation. MARY 9/14/2022  Color flow and doppler were performed. Global left ventricular function is moderately decreased with ejection fraction estimated from 30 % to 35 %. There is no evidence of mass or thrombus in the left atrium or appendage. The left atrium is dilated. Likely ligated appendage with small flow jet. Plaque is noted in the thoracic aorta. The right ventricle is enlarged. Right ventricular systolic function is moderate to severely reduced . s/p repair. Mild tricuspid regurgitation. Limited echo 8/15/2022  Normal left ventricle size, wall thickness, and systolic function with an estimated ejection fraction of 50%. Abnormal (paradoxical) septal motion is present likely due to post operative status. The right ventricle is severely enlarged. Right ventricular systolic function is moderately reduced. The right atrium is severely dilated. Ebstein's tricuspid valve repair via Cone repair with a peak velocity of 1.0 m/s and a mean gradient of 2 mmHg.   Mild tricuspid regurgitation with an RVSP of 47 mmHg. There is a bioprosthetic pulmonic valve replacement with a peak velocity of 1.4 m/s and a mean gradient of 6 mmHg. MARY 7/7/2022  Probable thrombosis of mechanical On-X tricuspid valve. Only one disc is seen, and the disc seen has reduced motion. Severe tricuspid stenosis by Doppler  Markedly dilated RA with spontaneous contrast.  RV severely enlarged with mildly reduced function. Pulmonary homograft not well seen, but suspect severe TN given degree of RV dilation. Left-sided chambers normal in size and function    Echo 06/16/2022   Normal left ventricle size, wall thickness, and systolic function with an estimated ejection fraction of 50%. No regional wall motion  abnormalities are seen. Abnormal septal motion with sudden severe flattening of septum at start of diastole followed by a septal 'bounce'  The right ventricle is severely enlarged. Right ventricular systolic function is mildly reduced. Mild mitral regurgitation. The native tricuspid valve is apically displaced with no leaflet coaptation consistent with Ebstein's anomaly. There is a mechanical tricuspid valve replacement at the same level of the mitral valve with a peak velocity of 2.5 m/s and a mean  gradient of 15 mmHg consistent with severe prosthetic valve stenosis. There are increased velocities through the TVR suggestive of prosthetic thickenening vs pannus. Mild tricuspid regurgitation with a PASP of 34 mmHg. s/p homograft PVR with no significant PS and at least moderate pulmonic regurgitation. Possible thrombus vs artifact visualized in the right atrium. Rhythm during study: atrial flutter  Recommend MARY to evaluate mechanism of TVR stenosis in more detail and to rule out RA thrombus. Consider cardiac MR or CT to  further quantitate pulmonary valve regurgitation given that the homograft is > 21years old and degree of TN is likely underestimated  given size of RV.     I independently reviewed the cardiac diagnostic studies, ECG and relevant imaging studies. Lab Results   Component Value Date    LVEF 33 09/14/2022     No results found for: TSHFT4, TSH    Physical Examination:  Vitals:    11/16/22 1603   BP: 104/80   Pulse: (!) 103   SpO2: 99%        Wt Readings from Last 3 Encounters:   11/16/22 131 lb (59.4 kg)   09/14/22 134 lb (60.8 kg)   08/15/22 126 lb 6.4 oz (57.3 kg)       Constitutional: Oriented. No distress. Head: Normocephalic and atraumatic. Mouth/Throat: Oropharynx is clear and moist.   Eyes: Conjunctivae normal. EOM are normal.   Neck: Neck supple. No rigidity. No JVD present. Cardiovascular: Normal rate, irregular rhythm, S1&S2. Pulmonary/Chest: Bilateral respiratory sounds. No wheezes, No rhonchi. Abdominal: Soft. Bowel sounds present. No distension, No tenderness. Musculoskeletal: No tenderness. No edema    Lymphadenopathy: Has no cervical adenopathy. Neurological: Alert and oriented. Cranial nerve appears intact, No Gross deficit   Skin: Skin is warm and dry. No rash noted. Psychiatric: Has a normal behavior       Review of System:  [x] Full ROS obtained and negative except as mentioned in HPI    Prior to Admission medications    Medication Sig Start Date End Date Taking?  Authorizing Provider   metoprolol tartrate (LOPRESSOR) 25 MG tablet Take 0.5 tablets by mouth 2 times daily 10/10/22  Yes Peter Mera MD   acetaminophen (TYLENOL) 500 MG tablet Take 1,000 mg by mouth every 6 hours as needed for Pain   Yes Historical Provider, MD   warfarin (COUMADIN) 5 MG tablet  6/9/22  Yes Historical Provider, MD   Multiple Vitamins-Minerals (MULTI ADULT GUMMIES PO) Take by mouth   Yes Historical Provider, MD   Ascorbic Acid (VITAMIN C) 250 MG tablet Take 250 mg by mouth daily   Yes Historical Provider, MD   Cholecalciferol (VITAMIN D3) 125 MCG (5000 UT) TABS Take by mouth   Yes Historical Provider, MD   Coenzyme Q10 (COQ10) 100 MG CAPS Take by mouth   Yes Historical Provider, MD   Lactobacillus (PROBIOTIC ACIDOPHILUS) CAPS Take by mouth   Yes Historical Provider, MD       Past Medical History:   Diagnosis Date    Atrial fibrillation ([address]     Atrial flutter ([address]         Past Surgical History:   Procedure Laterality Date    PULMONARY VALVE REPLACEMENT      PULMONARY VALVE REPLACEMENT      7/20/22, tricuspid repair       Allergies   Allergen Reactions    Compazine [Prochlorperazine] Other (See Comments)     Hallucinations     Sulfa Antibiotics Hives    Quinidine Sulfate Headaches, Nausea And Vomiting and Photosensitivity       Social History:  Reviewed. reports that she quit smoking about 16 months ago. Her smoking use included cigarettes. She has a 2.50 pack-year smoking history. She has never used smokeless tobacco. She reports current alcohol use of about 2.0 standard drinks per week. She reports that she does not use drugs. Family History:  Reviewed. Reviewed. No family history of SCD. Relevant and available labs, and cardiovascular diagnostics reviewed. Reviewed. I independently reviewed all cardiac tracing. I independently reviewed relevant and available cardiac diagnostic tests ECG, CXR, Echo, Stress test, Device interrogation, Holter, CT scan. Outside medical records via Care everywhere reviewed and summarized in H&P above. Complex medical condition with multiple medical problems affecting prognosis and outcome of EP interventions       - The patient is counseled to follow a low salt diet to assure blood pressure remains controlled for cardiovascular risk factor modification.   - The patient is counseled to avoid excess caffeine, and energy drinks as this may exacerbated ectopy and arrhythmia. - The patient is counseled to get regular exercise 3-5 times per week to control cardiovascular risk factors. All questions and concerns were addressed to the patient/family. Alternatives to my treatment were discussed.  I have discussed the above stated plan and the patient verbalized understanding and agreed with the plan. Scribe attestation: This note was scribed in the presence of Oz Victoria MD by Richa Epperson RN    I, Dr. Oz Victoria personally performed the services described in this documentation as scribed by RN in my presence, and it is both accurate and complete. NOTE: This report was transcribed using voice recognition software. Every effort was made to ensure accuracy, however, inadvertent computerized transcription errors may be present.      Oz Victoria MD, [facility], [address] [place] [address]   Office: [phone]  Fax: [phone]

## 2022-11-16 ENCOUNTER — OFFICE VISIT (OUTPATIENT)
Dept: CARDIOLOGY CLINIC | Age: 47
End: 2022-11-16
Payer: COMMERCIAL

## 2022-11-16 ENCOUNTER — HOSPITAL ENCOUNTER (OUTPATIENT)
Dept: NON INVASIVE DIAGNOSTICS | Age: 47
Discharge: HOME | End: 2022-11-16
Payer: COMMERCIAL

## 2022-11-16 VITALS
WEIGHT: 131 LBS | OXYGEN SATURATION: 99 % | HEART RATE: 103 BPM | DIASTOLIC BLOOD PRESSURE: 80 MMHG | BODY MASS INDEX: 24.73 KG/M2 | HEIGHT: 61 IN | SYSTOLIC BLOOD PRESSURE: 104 MMHG

## 2022-11-16 DIAGNOSIS — I48.4 ATYPICAL ATRIAL FLUTTER (HCC): Primary | ICD-10-CM

## 2022-11-16 DIAGNOSIS — Z86.79 STATUS POST ABLATION OF ATRIAL FIBRILLATION: ICD-10-CM

## 2022-11-16 DIAGNOSIS — I48.19 PERSISTENT ATRIAL FIBRILLATION WITH RVR (HCC): ICD-10-CM

## 2022-11-16 DIAGNOSIS — I48.91 ATRIAL FIBRILLATION, UNSPECIFIED TYPE (HCC): ICD-10-CM

## 2022-11-16 DIAGNOSIS — I51.9 LV DYSFUNCTION: ICD-10-CM

## 2022-11-16 DIAGNOSIS — Z98.890 STATUS POST ABLATION OF ATRIAL FIBRILLATION: ICD-10-CM

## 2022-11-16 DIAGNOSIS — I10 BENIGN ESSENTIAL HTN: ICD-10-CM

## 2022-11-16 LAB
LV EF: 40 %
LVEF MODALITY: NORMAL

## 2022-11-16 PROCEDURE — 93000 ELECTROCARDIOGRAM COMPLETE: CPT | Performed by: INTERNAL MEDICINE

## 2022-11-16 PROCEDURE — 3078F DIAST BP <80 MM HG: CPT | Performed by: INTERNAL MEDICINE

## 2022-11-16 PROCEDURE — 3074F SYST BP LT 130 MM HG: CPT | Performed by: INTERNAL MEDICINE

## 2022-11-16 PROCEDURE — 93306 TTE W/DOPPLER COMPLETE: CPT

## 2022-11-16 PROCEDURE — 99214 OFFICE O/P EST MOD 30 MIN: CPT | Performed by: INTERNAL MEDICINE

## 2022-11-16 NOTE — LETTER
[facility]  EP Procedure Sheet    11/16/22  Bing Boxer  1975  EP Procedures  [] Pacemaker implant (single/dual) [] EP Study   [] ICD implant (single/dual) [] Atrial flutter ablation (MARY Y/N)   [] Biv implant ICD [] Tilt Table   [] Biv implant PPM [] Atrial fibrillation ablation (MARY Yes)   [] Generator Change (PPM/ICD/BiV) [] SVT ablation   [] Lead revision (RV/LA/RA) (<1 month) [] PVC ablation     [] Lead extraction +/- upgrade (BiV/PPM/ICD) [] VT Ischemic/ non-ischemic   [] Loop implant/ removal [] VT RVOT   [x] Cardioversion [] VT Left sided   [] MARY [] AVN ablation   Equipment  [] [employer]  [] [employer] Mapping System   [] [place] [] [address]   [] [employer] [] CryoAblation   [] Biotronik [] Laser Lead Extraction   EP Procedures Scheduling Request  # hours Requested  []1 []2 []2-4 [] 4-6 Scheduled  Date:   Specific Day  Completed    Anesthesia []yes [x]no F/u Date:   CT surgery backup []yes [x]no COVID     Overnight stay      Performing MD  []RMM [x]MXA   []MKW [] CMV First vs repeat   []1st [] 2nd [] 3rd   Pre-Procedure Labs / Imaging  [] PT/INR [] Type & cross   [] CBC [] Units PRBC   [] BMP/Mg [] Units FFP   [] Venogram [] Cardiac CTA for Pulmonary vein mapping     RN INITIALS: RA    Patient Instructions  Do not eat or drink after midnight the night prior to procedure  Dx:Atrial flutter  ICD-10 code: I 48.92  On coumadin

## 2022-11-16 NOTE — PATIENT INSTRUCTIONS
You are scheduled for a cardioversion. Our  will call you to discuss a date for your procedure. The day of your procedure you will need to check in at the Registration Desk in the [address]. PRE-PROCEDURE INSTRUCTIONS   Do not eat or drink anything after midnight the night before your procedure. Take your medications with a sip of water in the morning. Do not hold your Warfarin    Please have a responsible adult to drive you home after your procedure. If you have any questions regarding your procedure itself or your medications, please call [phone] and ask to speak to an EP nurse.

## 2022-11-18 DIAGNOSIS — I48.19 PERSISTENT ATRIAL FIBRILLATION WITH RVR (HCC): Primary | ICD-10-CM

## 2022-12-02 ENCOUNTER — HOSPITAL ENCOUNTER (OUTPATIENT)
Dept: CARDIAC CATH/INVASIVE PROCEDURES | Age: 47
Discharge: HOME | End: 2022-12-02

## 2022-12-19 ENCOUNTER — HOSPITAL ENCOUNTER (OUTPATIENT)
Dept: CARDIAC CATH/INVASIVE PROCEDURES | Age: 47
Discharge: HOME | End: 2022-12-19
Attending: INTERNAL MEDICINE | Admitting: INTERNAL MEDICINE
Payer: COMMERCIAL

## 2022-12-19 VITALS
DIASTOLIC BLOOD PRESSURE: 67 MMHG | OXYGEN SATURATION: 98 % | BODY MASS INDEX: 24.73 KG/M2 | TEMPERATURE: 98 F | HEIGHT: 61 IN | HEART RATE: 74 BPM | SYSTOLIC BLOOD PRESSURE: 116 MMHG | WEIGHT: 131 LBS

## 2022-12-19 DIAGNOSIS — I48.91 ATRIAL FIBRILLATION, UNSPECIFIED TYPE (HCC): Primary | ICD-10-CM

## 2022-12-19 LAB
EKG ATRIAL RATE: 62 BPM
EKG ATRIAL RATE: 68 BPM
EKG DIAGNOSIS: NORMAL
EKG DIAGNOSIS: NORMAL
EKG P AXIS: 216 DEGREES
EKG P-R INTERVAL: 104 MS
EKG Q-T INTERVAL: 418 MS
EKG Q-T INTERVAL: 492 MS
EKG QRS DURATION: 156 MS
EKG QRS DURATION: 158 MS
EKG QTC CALCULATION (BAZETT): 463 MS
EKG QTC CALCULATION (BAZETT): 499 MS
EKG R AXIS: 67 DEGREES
EKG R AXIS: 86 DEGREES
EKG T AXIS: -31 DEGREES
EKG T AXIS: -46 DEGREES
EKG VENTRICULAR RATE: 62 BPM
EKG VENTRICULAR RATE: 74 BPM

## 2022-12-19 PROCEDURE — 93010 ELECTROCARDIOGRAM REPORT: CPT | Performed by: INTERNAL MEDICINE

## 2022-12-19 PROCEDURE — 7100000010 HC PHASE II RECOVERY - FIRST 15 MIN

## 2022-12-19 PROCEDURE — 92960 CARDIOVERSION ELECTRIC EXT: CPT

## 2022-12-19 PROCEDURE — 93005 ELECTROCARDIOGRAM TRACING: CPT | Performed by: INTERNAL MEDICINE

## 2022-12-19 RX ORDER — SODIUM CHLORIDE 0.9 % (FLUSH) 0.9 %
5-40 SYRINGE (ML) INJECTION PRN
Status: DISCONTINUED | OUTPATIENT
Start: 2022-12-19 | End: 2022-12-19 | Stop reason: HOSPADM

## 2022-12-19 NOTE — DISCHARGE INSTRUCTIONS
CARDIOVERSION DISCHARGE INSTRUCTIONS    No driving for 24 hours. We strongly recommend that a responsible adult stay with you for the next 24 hours. Continue Eliquis    Hydrocortisone 1% cream to reddened areas as needed.     Questions or concerns, call Dr Lorenza Jackson [phone]

## 2022-12-19 NOTE — PRE SEDATION
Sedation Pre-Procedure Note    Patient Name: Shantal Navarrete   YOB: 1975  Room/Bed: Cath/NONE  Medical Record Number: [id number]  Date: 12/19/2022   Time: 12:51 PM       Indication:  Rui Ballard    Consent: I have discussed with the patient and/or the patient representative the indication, alternatives, and the possible risks and/or complications of the planned procedure and the anesthesia methods. The patient and/or patient representative appear to understand and agree to proceed. Vital Signs:   Vitals:    12/19/22 1240   BP: 116/67   Pulse: 74   Temp: 98 °F (36.7 °C)   SpO2: 98%       Past Medical History:   has a past medical history of Atrial fibrillation ([facility]) and Atrial flutter ([address] Past Surgical History:   has a past surgical history that includes pulmonary valve replacement and pulmonary valve repair. Medications:   Scheduled Meds:   Continuous Infusions:   PRN Meds: sodium chloride flush  Home Meds:   Prior to Admission medications    Medication Sig Start Date End Date Taking?  Authorizing Provider   apixaban (ELIQUIS) 5 MG TABS tablet Take 1 tablet by mouth 2 times daily 11/18/22   Karin Cranker, MD   metoprolol tartrate (LOPRESSOR) 25 MG tablet Take 0.5 tablets by mouth 2 times daily 10/10/22   Karin Cranker, MD   acetaminophen (TYLENOL) 500 MG tablet Take 1,000 mg by mouth every 6 hours as needed for Pain    Historical Provider, MD   Multiple Vitamins-Minerals (MULTI ADULT GUMMIES PO) Take by mouth    Historical Provider, MD   Ascorbic Acid (VITAMIN C) 250 MG tablet Take 250 mg by mouth daily    Historical Provider, MD   Cholecalciferol (VITAMIN D3) 125 MCG (5000 UT) TABS Take by mouth    Historical Provider, MD   Coenzyme Q10 (COQ10) 100 MG CAPS Take by mouth    Historical Provider, MD   Lactobacillus (PROBIOTIC ACIDOPHILUS) CAPS Take by mouth    Historical Provider, MD     Coumadin Use Last 7 Days:  no  Antiplatelet drug therapy use last 7 days: no  Other anticoagulant use last 7 days: yes - Eliquis  Additional Medication Information:  see above list      Pre-Sedation Documentation and Exam:   I have personally completed a history, physical exam & review of systems for this patient (see notes). Vital signs have been reviewed (see flow sheet for vitals).     Mallampati Airway Assessment:  normal    Prior History of Anesthesia Complications:   none    ASA Classification:  Class 2 - A normal healthy patient with mild systemic disease    Sedation/ Anesthesia Plan:   intravenous deep sedation    Medications Planned:   IV brevital     Patient is an appropriate candidate for plan of sedation: yes    Electronically signed by Cheli Askew MD on 12/19/2022 at 12:51 PM

## 2022-12-19 NOTE — PROGRESS NOTES
Cardioversion procedure note:       Informed consent explained to patient and consent form signed by patient. Pads placed in appropraite position, site confirmed   Time-out performed prior to initiation of procedure. Presenting rhyhm: Atrial flutter   Sedation: IV brevital 50 mg x 1 pushed by Dr Harinder Eisenberg   # of attempts: 1 with 100 Joules       Results: successful conversion to normal sinus rhythm with frequent PACs in a pattern of bigeminy. Patient tolerated procedure well.

## 2022-12-19 NOTE — H&P
[facility] for procedure    REASON FOR CONSULT/CHIEF COMPLAINT/HPI     Reason for visit/ Chief complaint   Atrial flutter    HPI Astrid Martinez is a 52 y.o. w complex CHD history well-known to me. She remains in atrial flutter late after having surgery and an atrial flutter ablation earlier this year. She can feel palpitations and wishes conversion to NSR. She was switched from Coumadin to Eliquis earlier this year without concerns. Patient is adherent with medications and is tolerating them well without side effects     HISTORY/ALLERGIES/ROS     MedHx:  has a past medical history of Atrial fibrillation ([address] and Atrial flutter ([address] SurgHx:  has a past surgical history that includes pulmonary valve replacement and pulmonary valve repair. SocHx:  reports that she quit smoking about 17 months ago. Her smoking use included cigarettes. She has a 2.50 pack-year smoking history. She has never used smokeless tobacco. She reports current alcohol use of about 2.0 standard drinks per week. She reports that she does not use drugs. FamHx: No family history of premature coronary artery disease, sudden death, or aneurysm  Allergies: Compazine [prochlorperazine], Sulfa antibiotics, and Quinidine sulfate       MEDICATIONS      Prior to Admission medications    Medication Sig Start Date End Date Taking?  Authorizing Provider   apixaban (ELIQUIS) 5 MG TABS tablet Take 1 tablet by mouth 2 times daily 11/18/22   Benjy Decker MD   metoprolol tartrate (LOPRESSOR) 25 MG tablet Take 0.5 tablets by mouth 2 times daily 10/10/22   Benjy Decker MD   acetaminophen (TYLENOL) 500 MG tablet Take 1,000 mg by mouth every 6 hours as needed for Pain    Historical Provider, MD   Multiple Vitamins-Minerals (MULTI ADULT GUMMIES PO) Take by mouth    Historical Provider, MD   Ascorbic Acid (VITAMIN C) 250 MG tablet Take 250 mg by mouth daily    Historical Provider, MD   Cholecalciferol (VITAMIN D3) 125 MCG (5000 UT) TABS Take by mouth    Historical Provider, MD   Coenzyme Q10 (COQ10) 100 MG CAPS Take by mouth    Historical Provider, MD   Lactobacillus (PROBIOTIC ACIDOPHILUS) CAPS Take by mouth    Historical Provider, MD       PHYSICAL EXAM        Vitals:    12/19/22 1240   BP: 116/67   Pulse: 74   Temp: 98 °F (36.7 °C)   SpO2: 98%    Weight: 131 lb (59.4 kg)     Gen Alert, cooperative, no distress Heart  Regular rate and rhythm, no murmur   Head Normocephalic, atraumatic, no abnormalities Abd  Soft, NT, +BS, no mass, no OM   Eyes PERRLA, conj/corn clear Ext  Ext nl, AT, no C/C, no edema   Nose Nares normal, no drain age, Non-tender Pulse 2+ and symmetric   Throat Lips, mucosa, tongue normal Skin Color/text/turg nl, no rash/lesions   Neck S/S, TM, NT, no bruit Psych Nl mood and affect   Lung CTA-B, unlabored, no DTP     Ch wall NT, no deform       LABS and Imaging     Relevant and available CV data reviewed    EKG personally interpreted: Atrial flutter with controlled V rate    Outside/Care everywhere records Reviewed  Labs Reviewed  Prior Imaging, ekg, cath, echo reviewed when available  Medications reviewed  Old Notes reviewed  ASSESSMENT AND PLAN     1. Atypical atrial flutter  Plan:  Cardiovert today with deep sedation (IV brevital)  Patient has been on uninterrputed Eliquis for at least 3 weeks  R/B/A discussed with patient and she wants to proceed      Shahrzad Paul MD  Cardiologist [facility]

## 2022-12-23 NOTE — PROCEDURES
Cardioversion procedure note:                                       Informed consent explained to patient and consent form signed by patient. Pads placed in appropraite position, site confirmed    Time-out performed prior to initiation of procedure. Presenting rhyhm: Atrial flutter             Sedation: IV brevital 50 mg x 1 pushed by Dr Beck Guerrero          # of attempts: 1 with 100 Joules                                     Results: successful conversion to normal sinus rhythm with frequent PACs in a pattern of bigeminy. Patient tolerated procedure well.

## 2023-01-17 NOTE — TELEPHONE ENCOUNTER
Received refill request for apixaban (ELIQUIS) 5 MG TABS  from [facility].      Last OV: 11- MXA    Next OV: 2- MXA    Last Labs: 9- CBC    Last Filled:  11-  ANGIE

## 2023-01-24 ENCOUNTER — TELEPHONE (OUTPATIENT)
Dept: CARDIOLOGY CLINIC | Age: 48
End: 2023-01-24

## 2023-01-24 NOTE — TELEPHONE ENCOUNTER
Pt called back. States she would like to proceed with scheduling of ablation.     Also asking if she can be seen by mxa before 2/16/23

## 2023-01-24 NOTE — TELEPHONE ENCOUNTER
----- Message from Jose Yeboah MD sent at 1/23/2023  7:02 PM EST -----  Please call the patient and let her know that I think we should proceed with another ablation since she has had more episodes of the flutter.  More than happy to see her in the office to talk about it or just go ahead and schedule it.  ----- Message -----  From: Ac Barriga MD  Sent: 1/23/2023   3:30 PM EST  To: Jose Yeboah MD    41% flutter burden.  Can you let patient know what your plan is.  Thanks

## 2023-01-27 ENCOUNTER — TELEPHONE (OUTPATIENT)
Dept: CARDIOLOGY CLINIC | Age: 48
End: 2023-01-27

## 2023-01-27 NOTE — LETTER
[address]  EP Procedure Sheet    1/27/23  Fracisco Figueredo  1975  EP Procedures  [] Pacemaker implant (single/dual) [] EP Study   [] ICD implant (single/dual) [] Atrial flutter ablation (MARY Y/N)   [] Biv implant ICD [] Tilt Table   [] Biv implant PPM [] Atrial fibrillation ablation (MARY Yes)   [] Generator Change (PPM/ICD/BiV) [] SVT ablation   [] Lead revision (RV/LA/RA) (<1 month) [] PVC ablation     [] Lead extraction +/- upgrade (BiV/PPM/ICD) [] VT Ischemic/ non-ischemic   [] Loop implant/ removal [] VT RVOT   [x] Cardioversion [] VT Left sided   [] MARY [] AVN ablation   Equipment  [] [employer] Chitosan  [] [employer] Mapping System   [] [place] [] [address]   [] [employer] [] CryoAblation   [] Biotronik [] Laser Lead Extraction   EP Procedures Scheduling Request  # hours Requested  []1 []2 []2-4 [] 4-6 Scheduled  Date:   Specific Day  Completed    Anesthesia []yes [x]no F/u Date:   CT surgery backup []yes []no COVID     Overnight stay      Performing MD  []RMM [x]MXA   []MKW [] CMV First vs repeat   []1st [x] 2nd [] 3rd   Pre-Procedure Labs / Imaging  [] PT/INR [] Type & cross   [] CBC [] Units PRBC   [] BMP/Mg [] Units FFP   [] Venogram [] Cardiac CTA for Pulmonary vein mapping     RN INITIALS: KBLPN  Patient Instructions  Do not eat or drink after midnight the night prior to procedure  Dx:Atrial Flutter ICD-10 code: I48.92

## 2023-01-27 NOTE — TELEPHONE ENCOUNTER
----- Message from Elvin Wallis MD sent at 1/26/2023  5:07 PM EST -----  Hi Marisela      Please call her and let her know that Dr. Alex Rose talk to me about her. We will do the cardioversion first    Please schedule her for cardioversion  Cancel her a flutter ablation    Please cancel her follow-up appointment in February. I will make a new appointment when she comes for cardioversion. thanks  ----- Message -----  From: Elvin Wallis MD  Sent: 1/26/2023   5:06 PM EST  To: Rena Chandler RN    Please schedule her for cardioversion  Cancel her a flutter ablation    Please cancel her follow-up appointment in February. I will make a new appointment when she comes for cardioversion.

## 2023-01-27 NOTE — TELEPHONE ENCOUNTER
LMOM for patient to return call in regards to message below. Letter has been created and sent to Julissa

## 2023-01-30 RX ORDER — SODIUM CHLORIDE 0.9 % (FLUSH) 0.9 %
5-40 SYRINGE (ML) INJECTION PRN
OUTPATIENT
Start: 2023-01-30

## 2023-02-06 ENCOUNTER — TELEPHONE (OUTPATIENT)
Dept: CARDIOLOGY CLINIC | Age: 48
End: 2023-02-06

## 2023-02-09 ENCOUNTER — HOSPITAL ENCOUNTER (OUTPATIENT)
Dept: CARDIAC CATH/INVASIVE PROCEDURES | Age: 48
Discharge: HOME | End: 2023-02-09

## 2023-02-16 ENCOUNTER — TELEPHONE (OUTPATIENT)
Dept: CARDIOLOGY CLINIC | Age: 48
End: 2023-02-16

## 2023-02-16 NOTE — TELEPHONE ENCOUNTER
Medication Question/Concern    What is the name of the medication you need to speak with someone about? Eliquis    Dosage of the medication: 5 mg    How are you taking this medication: 1 tab 2x daily    What issues/concerns are you having with this medication:  Pt is asking if ANGIE wants to refill this before her appt in May.

## 2023-02-16 NOTE — TELEPHONE ENCOUNTER
Returned Keke's call. She advised that her son is ill and needs to cancel CV for 2-17. She also advised that since her ablation she has only had 3 notifications of Afif on her Apple Watch. She just had one on Monday soon after she had acupuncture done. Since then the watch is showing back in s/r. She is asking if [facility] would still like to do the CV since she feels like she is stablized. Please call to advise. Thank you.

## 2023-02-17 ENCOUNTER — HOSPITAL ENCOUNTER (OUTPATIENT)
Dept: CARDIAC CATH/INVASIVE PROCEDURES | Age: 48
Discharge: HOME | End: 2023-02-17

## 2023-03-06 ENCOUNTER — HOSPITAL ENCOUNTER (OUTPATIENT)
Dept: CARDIAC CATH/INVASIVE PROCEDURES | Age: 48
Discharge: HOME | End: 2023-03-06
Attending: INTERNAL MEDICINE | Admitting: INTERNAL MEDICINE
Payer: COMMERCIAL

## 2023-03-06 VITALS
DIASTOLIC BLOOD PRESSURE: 76 MMHG | OXYGEN SATURATION: 98 % | HEIGHT: 61 IN | SYSTOLIC BLOOD PRESSURE: 128 MMHG | WEIGHT: 131 LBS | BODY MASS INDEX: 24.73 KG/M2 | HEART RATE: 103 BPM | RESPIRATION RATE: 16 BRPM

## 2023-03-06 LAB
HCT VFR BLD CALC: 39.7 % (ref 36–48)
HEMOGLOBIN: 13.7 G/DL (ref 12–16)
MCH RBC QN AUTO: 30.9 PG (ref 26–34)
MCHC RBC AUTO-ENTMCNC: 34.7 G/DL (ref 31–36)
MCV RBC AUTO: 89.3 FL (ref 80–100)
PDW BLD-RTO: 14 % (ref 12.4–15.4)
PLATELET # BLD: 197 K/UL (ref 135–450)
PMV BLD AUTO: 8.1 FL (ref 5–10.5)
RBC # BLD: 4.44 M/UL (ref 4–5.2)
WBC # BLD: 7.1 K/UL (ref 4–11)

## 2023-03-06 PROCEDURE — 36415 COLL VENOUS BLD VENIPUNCTURE: CPT

## 2023-03-06 PROCEDURE — 92960 CARDIOVERSION ELECTRIC EXT: CPT | Performed by: INTERNAL MEDICINE

## 2023-03-06 PROCEDURE — 99152 MOD SED SAME PHYS/QHP 5/>YRS: CPT | Performed by: INTERNAL MEDICINE

## 2023-03-06 PROCEDURE — 93005 ELECTROCARDIOGRAM TRACING: CPT | Performed by: INTERNAL MEDICINE

## 2023-03-06 PROCEDURE — 2500000003 HC RX 250 WO HCPCS

## 2023-03-06 PROCEDURE — 85027 COMPLETE CBC AUTOMATED: CPT

## 2023-03-06 PROCEDURE — 92960 CARDIOVERSION ELECTRIC EXT: CPT

## 2023-03-06 PROCEDURE — 7100000010 HC PHASE II RECOVERY - FIRST 15 MIN

## 2023-03-06 RX ORDER — SODIUM CHLORIDE 0.9 % (FLUSH) 0.9 %
5-40 SYRINGE (ML) INJECTION PRN
Status: DISCONTINUED | OUTPATIENT
Start: 2023-03-06 | End: 2023-03-06 | Stop reason: HOSPADM

## 2023-03-06 NOTE — PROCEDURES
[facility]     Electrophysiology Procedure Note       Date of Procedure: 3/6/2023  Patient's Name: Verla Kawasaki  YOB: 1975   Medical Record Number: [id number]  Procedure Performed by: Mohini Hassan MD    Procedures performed:  IV sedation. External Electrical cardioversion   Mallampati3  ASA 3    Indication of the procedure: Persistent   atrial flutter      Details of procedure: The patient was brought to the cath lab area in a fasting and non-sedated state. The risks, benefits and alternatives of the procedure were discussed with the patient. The patient opted to proceed with the procedure. Written informed consent was signed and placed in the chart. A timeout protocol was completed to identify the patient and the procedure being performed. I pushed 50+40 mg Brevital.   We monitored the patient's level of consciousness and vital signs/physiologic status throughout the procedure duration (see start and stop times below). Sedation:     Sedation start: 1227  Sedation stop: 1258     Patient is on chronic anticoagulation therapy. Then we used Brevital for sedation and electrical DC cardioversion was perfomred using 200J, synchronized shock. Patient was converted to sinus rhythm at 63 bpm. The patient tolerated the procedure well and there were no complications. Conclusion:   Successful external DC cardioversion of atrial  flutter . Plan:   The patient can be discharged if remains stable. Will continue with medical therapy.

## 2023-03-06 NOTE — DISCHARGE INSTRUCTIONS
CARDIOVERSION DISCHARGE INSTRUCTIONS    No driving for 24 hours. We strongly recommend that a responsible adult stay with you for the next 24 hours. Continue Eliquis. Hydrocortisone 1% cream to reddened areas as needed.

## 2023-03-06 NOTE — PROGRESS NOTES
Patient had cardioversion of atrial flutter today. If she has recurrence of atrial flutter, we will proceed with ablation. To her distance, I will follow her with virtual visits. She can have EKGs done ,if needed ,at her primary care and fax it to us.

## 2023-03-06 NOTE — H&P
[facility]   Electrophysiology      CC: Aflutter   HPI: Diane Morales is a 52 y.o. female h/o Ebstein anomaly,Open heart surgery x 4,  Cadaver pulmonic valve replacement 1999, tricuspid valve replacement 2009. Referred by PCP for persistent atrial fibrillation. Family history of complex congenital Heart Disease. She is here for a second opinion. 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 Selective venography of lower extremity, bilateral and left upper extremity    Interval History:   Her prior surname is Rj. [id number]. Tadeo Dunham presents to the office in follow up. She states that she felt like she was out of rhythm yesterday. She is in atrial flutter per ECG today. States that she has been aware of a heart rate between  bpm for the past month. Has been increasing her activity and has been speed walking with her mom. 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 - ECG 06/13/2022 - atrial fibrillation rate 59    - continue Lopressor 12.5 Mg but increase to twice a day   - Patient has a [id number] Score of 1  ( gender )     ~ continue Warfarin   -Discussed treatment options including doing a DCCV vs completing another ablation.        -Will proceed with DCCV           Congential Heart Disease/ Valve replacement/LV dysfunction   - s/p pulmonic and valve replacement   - Ebstein anomaly     Her LV function is reduced. EF is 41%. I reviewed all her echoes and I think more or less the EF has remained about the same with a slight variation depending on the rate at the time of the study. Due to low blood pressure, management of LV dysfunction has been difficult.     HTN  -Controlled 104/80  -BP goal <130/80  -Home BP monitoring encouraged, printed information provided on how to accurately measure BP at home.  -Counseled to follow a low salt diet to assure blood pressure remains controlled for cardiovascular risk factor modification.   -The patient is counseled to get regular exercise 3-5 times per week and maintain a healthy weight reduce cardiovascular risk factors. Continue same management. Plan:    [facility]    Patient Active Problem List    Diagnosis Date Noted    Nonrheumatic tricuspid valve stenosis 09/14/2022    Atypical atrial flutter ([address] 09/14/2022    Edema 06/16/2022    Persistent atrial fibrillation with RVR ([address] 06/15/2022    HTN (hypertension) 06/15/2022    Congenital heart disease 06/15/2022     Diagnostic studies:   ECG 11/16/22  Atrial Flutter  QTcH 445,    Echo 11/16/2022   Normal left ventricular wall thickness. Normal left ventricular cavity size. Mild to moderately diminished left ventricular systolic function with an   estimated ejection fraction of 40%. Abnormal septal motion likely due to   prior cardiac surgery and right bundle branch block. Indeterminate diastolic function. The right ventricle is severely dilated. RV systolic function is moderately   to severely depressed. The left atrium is mild to moderately dilated. The right atrium is severely dilated. The mitral valve is thickened with adequate excursion. Mild to moderate   mitral regurgitation. Patient s/p cone procedure for Ebstein's anomaly. Flow through repaired   tricuspid valve appears within normal limits with a tricuspid valve PV of   1.53 m/s and MPG of 3-4 mmHg. Mild to moderate tricuspid regurgitation with   an estimated RVSP of 45 mmHg; deducting mild gradient across pulmonic valve   estimated PASP is in the normal range. The bioprosthetic pulmonic valve appears well seated with a PV of 1.86 m/s   and MPG of 8 mmHg (expected range for this type of bioprosthesis).  No   paravalvular or intravalvular leaks visualized. The inferior vena cava is dilated with <50% respiratory variation. MARY 9/14/2022  Color flow and doppler were performed. Global left ventricular function is moderately decreased with ejection fraction estimated from 30 % to 35 %. There is no evidence of mass or thrombus in the left atrium or appendage. The left atrium is dilated. Likely ligated appendage with small flow jet. Plaque is noted in the thoracic aorta. The right ventricle is enlarged. Right ventricular systolic function is moderate to severely reduced . s/p repair. Mild tricuspid regurgitation. Limited echo 8/15/2022  Normal left ventricle size, wall thickness, and systolic function with an estimated ejection fraction of 50%. Abnormal (paradoxical) septal motion is present likely due to post operative status. The right ventricle is severely enlarged. Right ventricular systolic function is moderately reduced. The right atrium is severely dilated. Ebstein's tricuspid valve repair via Cone repair with a peak velocity of 1.0 m/s and a mean gradient of 2 mmHg. Mild tricuspid regurgitation with an RVSP of 47 mmHg. There is a bioprosthetic pulmonic valve replacement with a peak velocity of 1.4 m/s and a mean gradient of 6 mmHg. MARY 7/7/2022  Probable thrombosis of mechanical On-X tricuspid valve. Only one disc is seen, and the disc seen has reduced motion. Severe tricuspid stenosis by Doppler  Markedly dilated RA with spontaneous contrast.  RV severely enlarged with mildly reduced function. Pulmonary homograft not well seen, but suspect severe TN given degree of RV dilation. Left-sided chambers normal in size and function    Echo 06/16/2022   Normal left ventricle size, wall thickness, and systolic function with an estimated ejection fraction of 50%. No regional wall motion  abnormalities are seen.   Abnormal septal motion with sudden severe flattening of septum at start of diastole followed by a septal 'bounce'  The right ventricle is severely enlarged. Right ventricular systolic function is mildly reduced. Mild mitral regurgitation. The native tricuspid valve is apically displaced with no leaflet coaptation consistent with Ebstein's anomaly. There is a mechanical tricuspid valve replacement at the same level of the mitral valve with a peak velocity of 2.5 m/s and a mean  gradient of 15 mmHg consistent with severe prosthetic valve stenosis. There are increased velocities through the TVR suggestive of prosthetic thickenening vs pannus. Mild tricuspid regurgitation with a PASP of 34 mmHg. s/p homograft PVR with no significant PS and at least moderate pulmonic regurgitation. Possible thrombus vs artifact visualized in the right atrium. Rhythm during study: atrial flutter  Recommend MARY to evaluate mechanism of TVR stenosis in more detail and to rule out RA thrombus. Consider cardiac MR or CT to  further quantitate pulmonary valve regurgitation given that the homograft is > 21years old and degree of WA is likely underestimated  given size of RV. I independently reviewed the cardiac diagnostic studies, ECG and relevant imaging studies. Lab Results   Component Value Date    LVEF 33 09/14/2022     No results found for: TSHFT4, TSH    Physical Examination:  Vitals:    11/16/22 1603   BP: 104/80   Pulse: (!) 103   SpO2: 99%        Wt Readings from Last 3 Encounters:   11/16/22 131 lb (59.4 kg)   09/14/22 134 lb (60.8 kg)   08/15/22 126 lb 6.4 oz (57.3 kg)       Constitutional: Oriented. No distress. Head: Normocephalic and atraumatic. Mouth/Throat: Oropharynx is clear and moist.   Eyes: Conjunctivae normal. EOM are normal.   Neck: Neck supple. No rigidity. No JVD present. Cardiovascular: Normal rate, irregular rhythm, S1&S2. Pulmonary/Chest: Bilateral respiratory sounds. No wheezes, No rhonchi. Abdominal: Soft. Bowel sounds present. No distension, No tenderness.    Musculoskeletal: No tenderness. No edema    Lymphadenopathy: Has no cervical adenopathy. Neurological: Alert and oriented. Cranial nerve appears intact, No Gross deficit   Skin: Skin is warm and dry. No rash noted. Psychiatric: Has a normal behavior       Review of System:  [x] Full ROS obtained and negative except as mentioned in HPI    Prior to Admission medications    Medication Sig Start Date End Date Taking? Authorizing Provider   metoprolol tartrate (LOPRESSOR) 25 MG tablet Take 0.5 tablets by mouth 2 times daily 10/10/22  Yes Julio Blanchard MD   acetaminophen (TYLENOL) 500 MG tablet Take 1,000 mg by mouth every 6 hours as needed for Pain   Yes Historical Provider, MD   warfarin (COUMADIN) 5 MG tablet  6/9/22  Yes Historical Provider, MD   Multiple Vitamins-Minerals ([address]) Take by mouth   Yes Historical Provider, MD   Ascorbic Acid (VITAMIN C) 250 MG tablet Take 250 mg by mouth daily   Yes Historical Provider, MD   Cholecalciferol (VITAMIN D3) 125 MCG (5000 UT) TABS Take by mouth   Yes Historical Provider, MD   Coenzyme Q10 (COQ10) 100 MG CAPS Take by mouth   Yes Historical Provider, MD   Lactobacillus (PROBIOTIC ACIDOPHILUS) CAPS Take by mouth   Yes Historical Provider, MD       Past Medical History:   Diagnosis Date    Atrial fibrillation ([address]     Atrial flutter [address]         Past Surgical History:   Procedure Laterality Date    PULMONARY VALVE REPLACEMENT      PULMONARY VALVE REPLACEMENT      7/20/22, tricuspid repair       Allergies   Allergen Reactions    Compazine [Prochlorperazine] Other (See Comments)     Hallucinations     Sulfa Antibiotics Hives    Quinidine Sulfate Headaches, Nausea And Vomiting and Photosensitivity       Social History:  Reviewed. reports that she quit smoking about 16 months ago. Her smoking use included cigarettes. She has a 2.50 pack-year smoking history. She has never used smokeless tobacco. She reports current alcohol use of about 2.0 standard drinks per week.  She reports that she does not use drugs.     Family History:  Reviewed. Reviewed. No family history of SCD.      Relevant and available labs, and cardiovascular diagnostics reviewed.   Reviewed.

## 2023-03-07 ENCOUNTER — TELEPHONE (OUTPATIENT)
Dept: CARDIOLOGY CLINIC | Age: 48
End: 2023-03-07

## 2023-03-07 LAB
EKG ATRIAL RATE: 102 BPM
EKG DIAGNOSIS: NORMAL
EKG Q-T INTERVAL: 420 MS
EKG QRS DURATION: 160 MS
EKG QTC CALCULATION (BAZETT): 487 MS
EKG R AXIS: 74 DEGREES
EKG T AXIS: -32 DEGREES
EKG VENTRICULAR RATE: 81 BPM

## 2023-03-07 PROCEDURE — 93010 ELECTROCARDIOGRAM REPORT: CPT | Performed by: INTERNAL MEDICINE

## 2023-03-07 NOTE — TELEPHONE ENCOUNTER
Millie Marmolejo called, she had CV with [facility] yesterday and they forgot until they were almost home to get a Work Excuse for  Daryl Avalos for yesterday. Can one of these be created and call Millie Marmolejo when done so that she can provide info of where/how to send? Thank you.

## 2023-03-07 NOTE — LETTER
[address] Cardiology[address]  Dept: [phone]  Dept Fax: [phone]      2023    Patient: Linda Robles  : 1975  DOS: 3/7/2023    To Whom it May Concern:    Sabrina Elder shall be excused from work on 2023, due to need to be present for his spouse's healthcare needs. If you have any questions or concerns, please do not hesitate to call.     Sincerely,    Flavio Mojica, CARRILLO-CNP

## 2023-03-08 ENCOUNTER — TELEPHONE (OUTPATIENT)
Dept: CARDIOLOGY CLINIC | Age: 48
End: 2023-03-08

## 2023-03-08 NOTE — TELEPHONE ENCOUNTER
Pt states she is not feeling well and having some issues after having a CV yesterday. Pt would like a call back to discuss. She states she was told to call if she had issues. Please advise pt.

## 2023-03-08 NOTE — TELEPHONE ENCOUNTER
Pt reports feeling lite headedness and HR in the 40''s earlier, also reports feeling extra beats. She reports HR is back up in the 60's and that she's taken her lopressor since. Please advise.

## 2023-03-08 NOTE — TELEPHONE ENCOUNTER
States she had an episode of  fast heart rate and is feeling a lot of extra beats. She states her heart rate is in the 40s and 50s. She is taking lopressor only once a day. She is continue to have jann cardia. Advised to hold  lopressor for heart rate less than 60. Call if atrial flutter is recurrent or heart rate does not come back up . Dr. Gianluca Blanca updated - no other changes at this time.

## 2023-03-27 NOTE — TELEPHONE ENCOUNTER
Received refill request for apixaban (ELIQUIS) 5 MG TABS from [address] [place] [address].      Last OV: 11-    Next OV: 5- WAK    Last CBC: 3-6-2023    Last Filled:  1- WAK

## 2023-05-05 PROBLEM — R00.2 PALPITATIONS: Status: ACTIVE | Noted: 2023-05-05

## 2023-05-05 PROBLEM — Q22.5 EBSTEIN ANOMALY: Status: ACTIVE | Noted: 2023-05-05

## 2023-05-05 PROBLEM — Z98.890 S/P TRICUSPID VALVE REPAIR: Status: ACTIVE | Noted: 2023-05-05

## 2023-05-05 PROBLEM — I48.3 TYPICAL ATRIAL FLUTTER (HCC): Status: ACTIVE | Noted: 2023-05-05

## 2023-05-05 NOTE — TELEPHONE ENCOUNTER
No reply from Np. Will hold message open for a few more days before closing. If they need additional information they will call the office. RRT due to altered mental status

## 2023-05-15 ENCOUNTER — OFFICE VISIT (OUTPATIENT)
Dept: CARDIOLOGY CLINIC | Age: 48
End: 2023-05-15
Payer: COMMERCIAL

## 2023-05-15 VITALS
SYSTOLIC BLOOD PRESSURE: 112 MMHG | BODY MASS INDEX: 26.7 KG/M2 | HEART RATE: 83 BPM | OXYGEN SATURATION: 99 % | WEIGHT: 141.4 LBS | DIASTOLIC BLOOD PRESSURE: 64 MMHG | HEIGHT: 61 IN

## 2023-05-15 DIAGNOSIS — Z98.890 S/P TRICUSPID VALVE REPAIR: ICD-10-CM

## 2023-05-15 DIAGNOSIS — I48.3 TYPICAL ATRIAL FLUTTER (HCC): ICD-10-CM

## 2023-05-15 DIAGNOSIS — I10 BENIGN ESSENTIAL HTN: ICD-10-CM

## 2023-05-15 DIAGNOSIS — Q22.5 EBSTEIN ANOMALY: ICD-10-CM

## 2023-05-15 DIAGNOSIS — I48.19 PERSISTENT ATRIAL FIBRILLATION WITH RVR (HCC): ICD-10-CM

## 2023-05-15 DIAGNOSIS — R00.2 PALPITATIONS: ICD-10-CM

## 2023-05-15 DIAGNOSIS — Q24.9 CONGENITAL HEART DISEASE: Primary | ICD-10-CM

## 2023-05-15 PROCEDURE — 3074F SYST BP LT 130 MM HG: CPT | Performed by: INTERNAL MEDICINE

## 2023-05-15 PROCEDURE — 3078F DIAST BP <80 MM HG: CPT | Performed by: INTERNAL MEDICINE

## 2023-05-15 PROCEDURE — 93000 ELECTROCARDIOGRAM COMPLETE: CPT | Performed by: INTERNAL MEDICINE

## 2023-05-15 PROCEDURE — 99214 OFFICE O/P EST MOD 30 MIN: CPT | Performed by: INTERNAL MEDICINE

## 2023-05-15 NOTE — PATIENT INSTRUCTIONS
Follow up with Dr Genna Ormond in 4 months     30 day holter   Continue on Eliquis     Call for any questions or concerns.

## 2023-06-19 ENCOUNTER — TELEPHONE (OUTPATIENT)
Dept: CARDIOLOGY CLINIC | Age: 48
End: 2023-06-19

## 2023-06-19 NOTE — TELEPHONE ENCOUNTER
Pt called in stating she was told by [facility] to call the RN working with [facility] so that she can get scheduled to have an Ablation done. Relayed message per [facility] & RN that the  would be calling her to get her scheduled for the Ablation.  Pt v/u and stated she will be waiting on the call

## 2023-06-20 ENCOUNTER — TELEPHONE (OUTPATIENT)
Dept: CARDIOLOGY CLINIC | Age: 48
End: 2023-06-20

## 2023-06-20 NOTE — TELEPHONE ENCOUNTER
Procedure - MARY/Afl Abl   Date:  Arrival time:   Procedure time:     ATRIAL FLUTTER ABLATION     The morning of your ablation you will need to check in at the registration desk in the main lobby. PRE-PROCEDURE INSTRUCTIONS -   -Do not eat or drink anything after midnight the night before your ablation.  -You will need to have a responsible adult to drive you home.  -Your nurse will provide you with discharge instructions.  -You will need to hold your Eliquis for the night before and morning of ablation   -You may take all of your other medications with a sip of water. You will be scheduled for a 6-8 week follow up with cardiology. This will be done prior to your discharge instructions. If you have any questions regarding the procedure itself or medications, please call [phone] and ask to speak to an EP nurse.

## 2023-06-20 NOTE — TELEPHONE ENCOUNTER
Spoke with the patient and got her scheduled for procedure. She lives 2+ hours away. Would like to move up if possible to maybe a 10:30 time slot if one opens up. We went over instructions below and she verbalized understanding. Procedure - MARY/Afl Abl   Date: 9/13/2023  Arrival time: 6:45 am   Procedure time: 7:30 am      ATRIAL FLUTTER ABLATION     The morning of your ablation you will need to check in at the registration desk in the main lobby. PRE-PROCEDURE INSTRUCTIONS -   -Do not eat or drink anything after midnight the night before your ablation.  -You will need to have a responsible adult to drive you home.  -Your nurse will provide you with discharge instructions.  -You will need to hold your Eliquis for the night before and morning of ablation   -You may take all of your other medications with a sip of water. You will be scheduled for a 6-8 week follow up with cardiology. This will be done prior to your discharge instructions. If you have any questions regarding the procedure itself or medications, please call [phone] and ask to speak to an EP nurse.       Qgenda updated / Added in Epic / emailed cath lab/ carto added

## 2023-06-27 NOTE — TELEPHONE ENCOUNTER
Last OV: 05/15/2023  Meagan Hatfield  Last Labs:Ekg-05/15/2023   Meagan Hatfield  Next OV: 09/06/2023  Meagan Hatfield  Last Refill: Metoprolol-10/10/2022  Meagan Hatfield

## 2023-06-28 ENCOUNTER — TELEPHONE (OUTPATIENT)
Dept: CARDIOLOGY CLINIC | Age: 48
End: 2023-06-28

## 2023-08-04 ENCOUNTER — ANESTHESIA EVENT (OUTPATIENT)
Dept: CARDIAC CATH/INVASIVE PROCEDURES | Age: 48
End: 2023-08-04
Payer: COMMERCIAL

## 2023-08-07 ENCOUNTER — ANESTHESIA (OUTPATIENT)
Dept: CARDIAC CATH/INVASIVE PROCEDURES | Age: 48
End: 2023-08-07
Payer: COMMERCIAL

## 2023-08-07 ENCOUNTER — HOSPITAL ENCOUNTER (OUTPATIENT)
Dept: CARDIAC CATH/INVASIVE PROCEDURES | Age: 48
Discharge: HOME | End: 2023-08-10
Attending: INTERNAL MEDICINE | Admitting: INTERNAL MEDICINE
Payer: COMMERCIAL

## 2023-08-07 LAB
ABO + RH BLD: NORMAL
ANION GAP SERPL CALCULATED.3IONS-SCNC: 13 MMOL/L (ref 3–16)
BLD GP AB SCN SERPL QL: NORMAL
BUN SERPL-MCNC: 13 MG/DL (ref 7–20)
CALCIUM SERPL-MCNC: 9.7 MG/DL (ref 8.3–10.6)
CHLORIDE SERPL-SCNC: 100 MMOL/L (ref 99–110)
CO2 SERPL-SCNC: 21 MMOL/L (ref 21–32)
CREAT SERPL-MCNC: 0.5 MG/DL (ref 0.6–1.1)
DEPRECATED RDW RBC AUTO: 13.4 % (ref 12.4–15.4)
EKG ATRIAL RATE: 102 BPM
EKG DIAGNOSIS: NORMAL
EKG P-R INTERVAL: 202 MS
EKG Q-T INTERVAL: 412 MS
EKG QRS DURATION: 158 MS
EKG QTC CALCULATION (BAZETT): 536 MS
EKG R AXIS: 73 DEGREES
EKG T AXIS: -71 DEGREES
EKG VENTRICULAR RATE: 102 BPM
GFR SERPLBLD CREATININE-BSD FMLA CKD-EPI: >60 ML/MIN/{1.73_M2}
GLUCOSE SERPL-MCNC: 111 MG/DL (ref 70–99)
HCT VFR BLD AUTO: 45.2 % (ref 36–48)
HGB BLD-MCNC: 15.3 G/DL (ref 12–16)
LV EF: 28 %
LVEF MODALITY: NORMAL
MCH RBC QN AUTO: 30.9 PG (ref 26–34)
MCHC RBC AUTO-ENTMCNC: 33.8 G/DL (ref 31–36)
MCV RBC AUTO: 91.6 FL (ref 80–100)
PLATELET # BLD AUTO: 183 K/UL (ref 135–450)
PMV BLD AUTO: 9.1 FL (ref 5–10.5)
POC ACT LR: 245 SEC
POC ACT LR: 245 SEC
POC ACT LR: 260 SEC
POC ACT LR: 274 SEC
POC ACT LR: 277 SEC
POC ACT LR: 286 SEC
POC ACT LR: 303 SEC
POC ACT LR: 366 SEC
POTASSIUM SERPL-SCNC: 4.6 MMOL/L (ref 3.5–5.1)
RBC # BLD AUTO: 4.93 M/UL (ref 4–5.2)
SLIDE REVIEW: NORMAL
SODIUM SERPL-SCNC: 134 MMOL/L (ref 136–145)
WBC # BLD AUTO: 7 K/UL (ref 4–11)

## 2023-08-07 PROCEDURE — C1759 CATH, INTRA ECHOCARDIOGRAPHY: HCPCS

## 2023-08-07 PROCEDURE — 86901 BLOOD TYPING SEROLOGIC RH(D): CPT

## 2023-08-07 PROCEDURE — C1893 INTRO/SHEATH, FIXED,NON-PEEL: HCPCS

## 2023-08-07 PROCEDURE — 93312 ECHO TRANSESOPHAGEAL: CPT

## 2023-08-07 PROCEDURE — 3700000000 HC ANESTHESIA ATTENDED CARE

## 2023-08-07 PROCEDURE — 2500000003 HC RX 250 WO HCPCS: Performed by: NURSE ANESTHETIST, CERTIFIED REGISTERED

## 2023-08-07 PROCEDURE — 93662 INTRACARDIAC ECG (ICE): CPT

## 2023-08-07 PROCEDURE — C9399 UNCLASSIFIED DRUGS OR BIOLOG: HCPCS | Performed by: NURSE ANESTHETIST, CERTIFIED REGISTERED

## 2023-08-07 PROCEDURE — 80048 BASIC METABOLIC PNL TOTAL CA: CPT

## 2023-08-07 PROCEDURE — 6360000002 HC RX W HCPCS: Performed by: NURSE ANESTHETIST, CERTIFIED REGISTERED

## 2023-08-07 PROCEDURE — 93662 INTRACARDIAC ECG (ICE): CPT | Performed by: INTERNAL MEDICINE

## 2023-08-07 PROCEDURE — 2580000003 HC RX 258

## 2023-08-07 PROCEDURE — 85027 COMPLETE CBC AUTOMATED: CPT

## 2023-08-07 PROCEDURE — 3700000001 HC ADD 15 MINUTES (ANESTHESIA)

## 2023-08-07 PROCEDURE — 2700000000 HC OXYGEN THERAPY PER DAY

## 2023-08-07 PROCEDURE — C1732 CATH, EP, DIAG/ABL, 3D/VECT: HCPCS

## 2023-08-07 PROCEDURE — C1894 INTRO/SHEATH, NON-LASER: HCPCS

## 2023-08-07 PROCEDURE — 85347 COAGULATION TIME ACTIVATED: CPT

## 2023-08-07 PROCEDURE — C1730 CATH, EP, 19 OR FEW ELECT: HCPCS

## 2023-08-07 PROCEDURE — 2580000003 HC RX 258: Performed by: INTERNAL MEDICINE

## 2023-08-07 PROCEDURE — 93462 L HRT CATH TRNSPTL PUNCTURE: CPT

## 2023-08-07 PROCEDURE — 36415 COLL VENOUS BLD VENIPUNCTURE: CPT

## 2023-08-07 PROCEDURE — 2580000003 HC RX 258: Performed by: NURSE ANESTHETIST, CERTIFIED REGISTERED

## 2023-08-07 PROCEDURE — 93320 DOPPLER ECHO COMPLETE: CPT

## 2023-08-07 PROCEDURE — 86850 RBC ANTIBODY SCREEN: CPT

## 2023-08-07 PROCEDURE — 6360000002 HC RX W HCPCS

## 2023-08-07 PROCEDURE — 93462 L HRT CATH TRNSPTL PUNCTURE: CPT | Performed by: INTERNAL MEDICINE

## 2023-08-07 PROCEDURE — 93005 ELECTROCARDIOGRAM TRACING: CPT | Performed by: INTERNAL MEDICINE

## 2023-08-07 PROCEDURE — C1769 GUIDE WIRE: HCPCS

## 2023-08-07 PROCEDURE — 6370000000 HC RX 637 (ALT 250 FOR IP): Performed by: INTERNAL MEDICINE

## 2023-08-07 PROCEDURE — 93653 COMPRE EP EVAL TX SVT: CPT

## 2023-08-07 PROCEDURE — 93325 DOPPLER ECHO COLOR FLOW MAPG: CPT

## 2023-08-07 PROCEDURE — 93010 ELECTROCARDIOGRAM REPORT: CPT | Performed by: INTERNAL MEDICINE

## 2023-08-07 PROCEDURE — 93653 COMPRE EP EVAL TX SVT: CPT | Performed by: INTERNAL MEDICINE

## 2023-08-07 PROCEDURE — 86900 BLOOD TYPING SEROLOGIC ABO: CPT

## 2023-08-07 PROCEDURE — 2500000003 HC RX 250 WO HCPCS

## 2023-08-07 PROCEDURE — 94761 N-INVAS EAR/PLS OXIMETRY MLT: CPT

## 2023-08-07 RX ORDER — ASCORBIC ACID 500 MG
250 TABLET ORAL DAILY
Status: DISCONTINUED | OUTPATIENT
Start: 2023-08-07 | End: 2023-08-10 | Stop reason: HOSPADM

## 2023-08-07 RX ORDER — VANCOMYCIN HYDROCHLORIDE 1 G/20ML
INJECTION, POWDER, LYOPHILIZED, FOR SOLUTION INTRAVENOUS PRN
Status: DISCONTINUED | OUTPATIENT
Start: 2023-08-07 | End: 2023-08-07 | Stop reason: SDUPTHER

## 2023-08-07 RX ORDER — SODIUM CHLORIDE 9 MG/ML
INJECTION, SOLUTION INTRAVENOUS CONTINUOUS PRN
Status: DISCONTINUED | OUTPATIENT
Start: 2023-08-07 | End: 2023-08-07 | Stop reason: SDUPTHER

## 2023-08-07 RX ORDER — ACETAMINOPHEN 500 MG
1000 TABLET ORAL EVERY 6 HOURS PRN
Status: DISCONTINUED | OUTPATIENT
Start: 2023-08-07 | End: 2023-08-07

## 2023-08-07 RX ORDER — PROTAMINE SULFATE 10 MG/ML
INJECTION, SOLUTION INTRAVENOUS PRN
Status: DISCONTINUED | OUTPATIENT
Start: 2023-08-07 | End: 2023-08-07 | Stop reason: SDUPTHER

## 2023-08-07 RX ORDER — SODIUM CHLORIDE 0.9 % (FLUSH) 0.9 %
5-40 SYRINGE (ML) INJECTION PRN
Status: DISCONTINUED | OUTPATIENT
Start: 2023-08-07 | End: 2023-08-10 | Stop reason: HOSPADM

## 2023-08-07 RX ORDER — FENTANYL CITRATE 50 UG/ML
INJECTION, SOLUTION INTRAMUSCULAR; INTRAVENOUS PRN
Status: DISCONTINUED | OUTPATIENT
Start: 2023-08-07 | End: 2023-08-07 | Stop reason: SDUPTHER

## 2023-08-07 RX ORDER — ONDANSETRON 2 MG/ML
INJECTION INTRAMUSCULAR; INTRAVENOUS PRN
Status: DISCONTINUED | OUTPATIENT
Start: 2023-08-07 | End: 2023-08-07 | Stop reason: SDUPTHER

## 2023-08-07 RX ORDER — ROCURONIUM BROMIDE 10 MG/ML
INJECTION, SOLUTION INTRAVENOUS PRN
Status: DISCONTINUED | OUTPATIENT
Start: 2023-08-07 | End: 2023-08-07 | Stop reason: SDUPTHER

## 2023-08-07 RX ORDER — SODIUM CHLORIDE 9 MG/ML
INJECTION, SOLUTION INTRAVENOUS PRN
Status: DISCONTINUED | OUTPATIENT
Start: 2023-08-07 | End: 2023-08-09 | Stop reason: SDUPTHER

## 2023-08-07 RX ORDER — PROPOFOL 10 MG/ML
INJECTION, EMULSION INTRAVENOUS PRN
Status: DISCONTINUED | OUTPATIENT
Start: 2023-08-07 | End: 2023-08-07 | Stop reason: SDUPTHER

## 2023-08-07 RX ORDER — FUROSEMIDE 10 MG/ML
INJECTION INTRAMUSCULAR; INTRAVENOUS PRN
Status: DISCONTINUED | OUTPATIENT
Start: 2023-08-07 | End: 2023-08-07 | Stop reason: SDUPTHER

## 2023-08-07 RX ORDER — SUCCINYLCHOLINE/SOD CL,ISO/PF 100 MG/5ML
SYRINGE (ML) INTRAVENOUS PRN
Status: DISCONTINUED | OUTPATIENT
Start: 2023-08-07 | End: 2023-08-07 | Stop reason: SDUPTHER

## 2023-08-07 RX ORDER — LIDOCAINE HYDROCHLORIDE 20 MG/ML
INJECTION, SOLUTION EPIDURAL; INFILTRATION; INTRACAUDAL; PERINEURAL PRN
Status: DISCONTINUED | OUTPATIENT
Start: 2023-08-07 | End: 2023-08-07 | Stop reason: SDUPTHER

## 2023-08-07 RX ORDER — DEXAMETHASONE SODIUM PHOSPHATE 4 MG/ML
INJECTION, SOLUTION INTRA-ARTICULAR; INTRALESIONAL; INTRAMUSCULAR; INTRAVENOUS; SOFT TISSUE PRN
Status: DISCONTINUED | OUTPATIENT
Start: 2023-08-07 | End: 2023-08-07 | Stop reason: SDUPTHER

## 2023-08-07 RX ORDER — MIDAZOLAM HYDROCHLORIDE 1 MG/ML
INJECTION INTRAMUSCULAR; INTRAVENOUS PRN
Status: DISCONTINUED | OUTPATIENT
Start: 2023-08-07 | End: 2023-08-07 | Stop reason: SDUPTHER

## 2023-08-07 RX ORDER — ACETAMINOPHEN 325 MG/1
650 TABLET ORAL EVERY 4 HOURS PRN
Status: DISCONTINUED | OUTPATIENT
Start: 2023-08-07 | End: 2023-08-10 | Stop reason: HOSPADM

## 2023-08-07 RX ORDER — VECURONIUM BROMIDE 1 MG/ML
INJECTION, POWDER, LYOPHILIZED, FOR SOLUTION INTRAVENOUS PRN
Status: DISCONTINUED | OUTPATIENT
Start: 2023-08-07 | End: 2023-08-07 | Stop reason: SDUPTHER

## 2023-08-07 RX ORDER — SODIUM CHLORIDE 0.9 % (FLUSH) 0.9 %
5-40 SYRINGE (ML) INJECTION EVERY 12 HOURS SCHEDULED
Status: DISCONTINUED | OUTPATIENT
Start: 2023-08-07 | End: 2023-08-10 | Stop reason: HOSPADM

## 2023-08-07 RX ORDER — HEPARIN SODIUM 1000 [USP'U]/ML
INJECTION, SOLUTION INTRAVENOUS; SUBCUTANEOUS PRN
Status: DISCONTINUED | OUTPATIENT
Start: 2023-08-07 | End: 2023-08-07 | Stop reason: SDUPTHER

## 2023-08-07 RX ADMIN — ROCURONIUM BROMIDE 50 MG: 10 INJECTION, SOLUTION INTRAVENOUS at 13:27

## 2023-08-07 RX ADMIN — HEPARIN SODIUM 3000 UNITS: 1000 INJECTION INTRAVENOUS; SUBCUTANEOUS at 14:56

## 2023-08-07 RX ADMIN — DEXAMETHASONE SODIUM PHOSPHATE 8 MG: 4 INJECTION, SOLUTION INTRAMUSCULAR; INTRAVENOUS at 16:37

## 2023-08-07 RX ADMIN — SODIUM CHLORIDE: 9 INJECTION, SOLUTION INTRAVENOUS at 15:26

## 2023-08-07 RX ADMIN — Medication 40 MG: at 11:10

## 2023-08-07 RX ADMIN — ROCURONIUM BROMIDE 20 MG: 10 INJECTION, SOLUTION INTRAVENOUS at 15:30

## 2023-08-07 RX ADMIN — HEPARIN SODIUM 8000 UNITS: 1000 INJECTION INTRAVENOUS; SUBCUTANEOUS at 12:37

## 2023-08-07 RX ADMIN — SODIUM CHLORIDE, PRESERVATIVE FREE 10 ML: 5 INJECTION INTRAVENOUS at 20:56

## 2023-08-07 RX ADMIN — ONDANSETRON 4 MG: 2 INJECTION INTRAMUSCULAR; INTRAVENOUS at 11:15

## 2023-08-07 RX ADMIN — SODIUM CHLORIDE: 9 INJECTION, SOLUTION INTRAVENOUS at 10:51

## 2023-08-07 RX ADMIN — SUGAMMADEX 200 MG: 100 INJECTION, SOLUTION INTRAVENOUS at 16:15

## 2023-08-07 RX ADMIN — Medication 160 MG: at 11:00

## 2023-08-07 RX ADMIN — PROTAMINE SULFATE 20 MG: 10 INJECTION, SOLUTION INTRAVENOUS at 16:03

## 2023-08-07 RX ADMIN — FENTANYL CITRATE 50 MCG: 50 INJECTION, SOLUTION INTRAMUSCULAR; INTRAVENOUS at 11:00

## 2023-08-07 RX ADMIN — APIXABAN 5 MG: 5 TABLET, FILM COATED ORAL at 20:44

## 2023-08-07 RX ADMIN — DEXAMETHASONE SODIUM PHOSPHATE 8 MG: 4 INJECTION, SOLUTION INTRAMUSCULAR; INTRAVENOUS at 11:15

## 2023-08-07 RX ADMIN — PHENYLEPHRINE HYDROCHLORIDE 50 MCG/MIN: 10 INJECTION INTRAVENOUS at 11:10

## 2023-08-07 RX ADMIN — HEPARIN SODIUM 2000 UNITS: 1000 INJECTION INTRAVENOUS; SUBCUTANEOUS at 13:03

## 2023-08-07 RX ADMIN — VANCOMYCIN HYDROCHLORIDE 1000 MG: 1 INJECTION, POWDER, LYOPHILIZED, FOR SOLUTION INTRAVENOUS at 13:25

## 2023-08-07 RX ADMIN — LIDOCAINE HYDROCHLORIDE 100 MG: 20 INJECTION, SOLUTION EPIDURAL; INFILTRATION; INTRACAUDAL; PERINEURAL at 11:00

## 2023-08-07 RX ADMIN — FUROSEMIDE 20 MG: 10 INJECTION, SOLUTION INTRAMUSCULAR; INTRAVENOUS at 16:36

## 2023-08-07 RX ADMIN — VECURONIUM BROMIDE 10 MG: 1 INJECTION, POWDER, LYOPHILIZED, FOR SOLUTION INTRAVENOUS at 12:10

## 2023-08-07 RX ADMIN — MIDAZOLAM 1 MG: 1 INJECTION INTRAMUSCULAR; INTRAVENOUS at 10:51

## 2023-08-07 RX ADMIN — MIDAZOLAM 1 MG: 1 INJECTION INTRAMUSCULAR; INTRAVENOUS at 10:40

## 2023-08-07 RX ADMIN — FENTANYL CITRATE 50 MCG: 50 INJECTION, SOLUTION INTRAMUSCULAR; INTRAVENOUS at 16:39

## 2023-08-07 RX ADMIN — SODIUM CHLORIDE: 9 INJECTION, SOLUTION INTRAVENOUS at 11:50

## 2023-08-07 RX ADMIN — ROCURONIUM BROMIDE 30 MG: 10 INJECTION, SOLUTION INTRAVENOUS at 14:28

## 2023-08-07 RX ADMIN — PROPOFOL 200 MG: 10 INJECTION, EMULSION INTRAVENOUS at 11:00

## 2023-08-07 ASSESSMENT — PAIN DESCRIPTION - LOCATION: LOCATION: THROAT

## 2023-08-07 ASSESSMENT — PAIN SCALES - GENERAL: PAINLEVEL_OUTOF10: 4

## 2023-08-07 ASSESSMENT — LIFESTYLE VARIABLES: SMOKING_STATUS: 0

## 2023-08-07 NOTE — H&P
[facility]   Electrophysiology      CC: Aflutter   HPI: Stephanie Desouza is a 52 y.o. female h/o Ebstein anomaly,Open heart surgery x 4,  Cadaver pulmonic valve replacement 1999, tricuspid valve replacement 2009. Referred by PCP for persistent atrial fibrillation. Family history of complex congenital Heart Disease. She is here for a second opinion. 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 Selective venography of lower extremity, bilateral and left upper extremity    Interval History:   Her prior surname is Rj. [id number]. [address] presents to the office in follow up. She states that she felt like she was out of rhythm yesterday. She is in atrial flutter per ECG today. States that she has been aware of a heart rate between  bpm for the past month. Has been increasing her activity and has been speed walking with her mom. 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 - ECG 06/13/2022 - atrial fibrillation rate 59    - continue Lopressor 12.5 Mg but increase to twice a day   - Patient has a [id number]-RGIy Score of 1  ( gender )     ~ continue Warfarin   -Discussed treatment options including doing a DCCV vs completing another ablation. The risks, benefits and alternatives of the ablation procedure were discussed with the patient.  The risks including, but not limited to, the risks of bleeding, infection, radiation exposure, injury to vascular, cardiac and surrounding structures (including pneumothorax), stroke, cardiac perforation, tamponade, need for emergent open heart surgery, need for pacemaker implantation, Injury to the phrenic nerve, injury to the esophagus, myocardial infarction and death were discussed in detail. The patient opted to proceed with the ablation. Congential Heart Disease/ Valve replacement/LV dysfunction   - s/p pulmonic and valve replacement   - Ebstein anomaly     Her LV function is reduced. EF is 41%. I reviewed all her echoes and I think more or less the EF has remained about the same with a slight variation depending on the rate at the time of the study. Due to low blood pressure, management of LV dysfunction has been difficult. HTN  -Controlled 104/80  -BP goal <130/80  -Home BP monitoring encouraged, printed information provided on how to accurately measure BP at home.  -Counseled to follow a low salt diet to assure blood pressure remains controlled for cardiovascular risk factor modification.   -The patient is counseled to get regular exercise 3-5 times per week and maintain a healthy weight reduce cardiovascular risk factors. Continue same management. Plan:   Ablation     Patient Active Problem List    Diagnosis Date Noted    Nonrheumatic tricuspid valve stenosis 09/14/2022    Atypical atrial flutter ([address]) 09/14/2022    Edema 06/16/2022    Persistent atrial fibrillation with RVR ([address]) 06/15/2022    HTN (hypertension) 06/15/2022    Congenital heart disease 06/15/2022     Diagnostic studies:   ECG 11/16/22  Atrial Flutter  QTcH 445,    Echo 11/16/2022   Normal left ventricular wall thickness. Normal left ventricular cavity size. Mild to moderately diminished left ventricular systolic function with an   estimated ejection fraction of 40%. Abnormal septal motion likely due to   prior cardiac surgery and right bundle branch block. Indeterminate diastolic function. The right ventricle is severely dilated. RV systolic function is moderately   to severely depressed. The left atrium is mild to moderately dilated. The right atrium is severely dilated. The mitral valve is thickened with adequate excursion.  Mild to moderate   mitral

## 2023-08-07 NOTE — DISCHARGE INSTRUCTIONS
LEFT HEART CATHETERIZATION    Care of your puncture site:  Remove bandage 24 hours after the procedure. May shower in 24 hours but do not sit in a bathtub/pool of water for 3 days or until the wound is healed. Inspect the site daily and gently clean using soap and water while standing in the shower. Dry thoroughly and apply a Band-Aid that covers the entire site. Do not apply powder or lotion. Normal Observations:  Soreness or tenderness which may last one week. Mild oozing from the incision site. Possible bruising that could last 2 weeks. Activity:  You may resume driving 24 hours following the procedure. You may resume normal activity in 3 days or after the wound heals. Avoid lifting more than 10 pounds for 3 days or until the wound heals. Avoid strenuous exercise or activity for 1 week. Nutrition:  Regular diet   Drink at least 8 to 10 glasses of decaffeinated, non-alcoholic fluid for the next 24 hours to flush the x-ray dye used for your angiogram out of your body. Call your doctor immediately if your condition worsens, for any other concerns, for a follow-up appointment or if you experience any of the following:  Significant bleeding that does not stop after 10 minutes of applying firm pressure on the puncture site. Increased swelling on the groin or leg. Unusual pain, numbness, or tingling of the groin or down the leg. Any signs of infection such as: redness, yellow drainage at the site, swelling or pain.

## 2023-08-07 NOTE — PROGRESS NOTES
4 Eyes Skin Assessment     NAME:  Zehra Marvin  YOB: 1975  MEDICAL RECORD NUMBER:  [id number]    The patient is being assessed for  Admission    I agree that at least one RN has performed a thorough Head to Toe Skin Assessment on the patient. ALL assessment sites listed below have been assessed. Areas assessed by both nurses:    Head, Face, Ears, Shoulders, Back, Chest, Arms, Elbows, Hands, Sacrum. Buttock, Coccyx, Ischium, Legs. Feet and Heels, and Under Medical Devices         Does the Patient have a Wound?  No noted wound(s)       Keith Prevention initiated by RN: No  Wound Care Orders initiated by RN: No    Pressure Injury (Stage 3,4, Unstageable, DTI, NWPT, and Complex wounds) if present, place Wound referral order by RN under : No    New Ostomies, if present place, Ostomy referral order under : No     Nurse 1 eSignature: Electronically signed by Leda Martinez RN on 8/7/23 at 5:38 PM EDT    **SHARE this note so that the co-signing nurse can place an eSignature**    Nurse 2 eSignature: {Esignature:[id number]}

## 2023-08-07 NOTE — PROGRESS NOTES
Patient brought over to CVU from cath lab and attached to CVU monitoring. Report reviewed with cath lab RN. R IJ and L subclavian soft and no hematoma or oozing noted. Occlusive dressing dry and intact. C/o sore throat, antiseptic spray ordered. Awaiting further admission orders from Dr Oscar Santo. Ice chips provided to patient. Primary RN Yelitza Cheung.      Electronically signed by Kelly Levin RN on 8/7/2023 at 5:36 PM

## 2023-08-07 NOTE — PROCEDURES
[facility]     Electrophysiology Procedure Note       Date of Procedure: 8/7/2023  Patient's Name: Jhon Tony  YOB: 1975   Medical Record Number: [id number]  Referring Physician: Daly Larios   Procedure Performed by: Andressa Iyer MD    Procedure performed:  Comprehensive electrophysiological study with attempted induction of arrhythmia at baseline. Three-dimensional electroanatomic mapping of the right atrium  Left atrial recording and mapping via coronary sinus   Attempted transseptal catheterization  Radiofrequency ablation of scar dependent atrial flutterIntracardiac echo    Procedure was under general anesthesia    This was extremely difficult and complicated procedure. Patient has congenital heart disease with several prior cardiac surgeries. She has no access from the groin. The access had to be made from the neck and subclavian and antecubital veins. In addition to that patient has extremely large atrium which made him a function of chest x-ray difficult. Procedure was prolonged and lasted 3 times longer than average. Indications for procedure: Atrial flutter   Jhon Tony 50 y.o. female with PMH of hep C  Status post multiple valve surgeries and recurrent atrial flutter. Details of procedure: The risks, benefits and alternatives of the ablation procedure were discussed with the patient. The risks including, but not limited to, the risks of bleeding, infection, radiation exposure, injury to vascular, cardiac and surrounding structures (including pneumothorax), stroke, cardiac perforation, tamponade, need for emergent open heart surgery, need for pacemaker implantation, myocardial infarction and death were discussed in detail. The patient opted to proceed with the ablation. Written informed consent was signed and placed in the chart. The patient was brought to the electrophysiology lab in a fasting nonsedated state.  The patient was prepped and draped in a sterile fashion. A timeout protocol was completed to identify the patient and the procedure being performed. Procedure was done with general anesthesia      Initially patient had a left antecubital access with a long 6 Latvian sheath for coronary sinus catheter insertion. A 8 Latvian sheath was advanced into the right jugular vein for ablation and mapping. We gained access to veins using modified Seldinger technique and ultrasound guidance. Patient had a atrial flutter with cycling 311 ms baseline. Will use post intracardiac ultrasound and fluoroscopy. Initially we put the ICE catheter into the right atrium and geometry was created. Then coronary sinus was engaged with decapolar catheter. Activation was proximal to distal.    Using an octapolar catheter mapping of the right atrium was done through the jugular vein. This was very difficult due to extreme size of the right atrium and need for access from the neck. Full lab was done which covers the entire cycle length of tachycardia. There was an area of early meets late in the posterior superior right atrium. During this time the tachycardia cycle length also changed to 260 ms. Radiofrequency lesions were given in this area which resulted in change of tachycardia to cycle length of 308 ms. However this does not terminate tachycardia. Multiple mapping were done which showed again breakthrough from the same area slightly lower. Lesions given in this area did not change the tachycardia. During the procedure we changed the sheath from the regular 9 [place] to Agilis medium curve. Remapping was done and entrainment also was done. Entrainment showed long PPI lateral wall, around the most of the tricuspid annulus, coronary sinus and CTI. There was PPI equal to TCL around the lower part of the scar in the posterior wall. Lesions in the areas of good PPI were given without any effect.   Then decided to proceed with mapping of the

## 2023-08-07 NOTE — ANESTHESIA POSTPROCEDURE EVALUATION
Department of Anesthesiology  Postprocedure Note    Patient: Landon Alexander  MRN: [id number]  [facility] 1975  Date of evaluation: 8/7/2023      Procedure Summary     Date: 08/07/23 Room / Location: [facility] Cath Lab; [facility] Echocardiography    Anesthesia Start: 1051 Anesthesia Stop: 1646    Procedure: ECHO/ABLATION WITH ANESTHESIA Diagnosis: Unspecified atrial flutter    Scheduled Providers: Vitaly Solomon MD Responsible Provider: Marquita Cameron MD    Anesthesia Type: general ASA Status: 4          Anesthesia Type: No value filed.     Imelda Phase I:      Imelda Phase II:        Anesthesia Post Evaluation    Patient location during evaluation: ICU  Patient participation: complete - patient participated  Level of consciousness: awake and alert  Airway patency: patent  Nausea & Vomiting: no nausea and no vomiting  Complications: no  Cardiovascular status: blood pressure returned to baseline  Respiratory status: acceptable  Hydration status: euvolemic  Multimodal analgesia pain management approach  Pain management: adequate

## 2023-08-08 ENCOUNTER — APPOINTMENT (OUTPATIENT)
Dept: GENERAL RADIOLOGY | Age: 48
End: 2023-08-08
Attending: INTERNAL MEDICINE
Payer: COMMERCIAL

## 2023-08-08 PROBLEM — I50.22 CHRONIC SYSTOLIC HEART FAILURE (HCC): Status: ACTIVE | Noted: 2023-08-08

## 2023-08-08 PROBLEM — I49.5 SSS (SICK SINUS SYNDROME) (HCC): Status: ACTIVE | Noted: 2023-08-08

## 2023-08-08 LAB
ALBUMIN SERPL-MCNC: 4.2 G/DL (ref 3.4–5)
ALBUMIN/GLOB SERPL: 1.5 {RATIO} (ref 1.1–2.2)
ALP SERPL-CCNC: 48 U/L (ref 40–129)
ALT SERPL-CCNC: 40 U/L (ref 10–40)
ANION GAP SERPL CALCULATED.3IONS-SCNC: 11 MMOL/L (ref 3–16)
AST SERPL-CCNC: 43 U/L (ref 15–37)
BILIRUB SERPL-MCNC: 0.7 MG/DL (ref 0–1)
BUN SERPL-MCNC: 9 MG/DL (ref 7–20)
CALCIUM SERPL-MCNC: 8.8 MG/DL (ref 8.3–10.6)
CHLORIDE SERPL-SCNC: 103 MMOL/L (ref 99–110)
CO2 SERPL-SCNC: 24 MMOL/L (ref 21–32)
CREAT SERPL-MCNC: 0.6 MG/DL (ref 0.6–1.1)
DEPRECATED RDW RBC AUTO: 13.5 % (ref 12.4–15.4)
EKG ATRIAL RATE: 62 BPM
EKG DIAGNOSIS: NORMAL
EKG P-R INTERVAL: 96 MS
EKG Q-T INTERVAL: 456 MS
EKG QRS DURATION: 166 MS
EKG QTC CALCULATION (BAZETT): 462 MS
EKG R AXIS: 46 DEGREES
EKG T AXIS: 184 DEGREES
EKG VENTRICULAR RATE: 62 BPM
GFR SERPLBLD CREATININE-BSD FMLA CKD-EPI: >60 ML/MIN/{1.73_M2}
GLUCOSE SERPL-MCNC: 110 MG/DL (ref 70–99)
HCT VFR BLD AUTO: 38.4 % (ref 36–48)
HGB BLD-MCNC: 12.7 G/DL (ref 12–16)
INR PPP: 1.25 (ref 0.84–1.16)
LV EF: 33 %
LVEF MODALITY: NORMAL
MCH RBC QN AUTO: 30.8 PG (ref 26–34)
MCHC RBC AUTO-ENTMCNC: 33 G/DL (ref 31–36)
MCV RBC AUTO: 93.3 FL (ref 80–100)
NT-PROBNP SERPL-MCNC: 939 PG/ML (ref 0–124)
PLATELET # BLD AUTO: 184 K/UL (ref 135–450)
PMV BLD AUTO: 8.7 FL (ref 5–10.5)
POC ACT LR: >400 SEC
POC ACT LR: >400 SEC
POTASSIUM SERPL-SCNC: 3.7 MMOL/L (ref 3.5–5.1)
PROT SERPL-MCNC: 7 G/DL (ref 6.4–8.2)
PROTHROMBIN TIME: 15.7 SEC (ref 11.5–14.8)
RBC # BLD AUTO: 4.12 M/UL (ref 4–5.2)
SODIUM SERPL-SCNC: 138 MMOL/L (ref 136–145)
WBC # BLD AUTO: 13.3 K/UL (ref 4–11)

## 2023-08-08 PROCEDURE — 93005 ELECTROCARDIOGRAM TRACING: CPT | Performed by: INTERNAL MEDICINE

## 2023-08-08 PROCEDURE — 85610 PROTHROMBIN TIME: CPT

## 2023-08-08 PROCEDURE — 6370000000 HC RX 637 (ALT 250 FOR IP): Performed by: INTERNAL MEDICINE

## 2023-08-08 PROCEDURE — 83880 ASSAY OF NATRIURETIC PEPTIDE: CPT

## 2023-08-08 PROCEDURE — 80053 COMPREHEN METABOLIC PANEL: CPT

## 2023-08-08 PROCEDURE — 6370000000 HC RX 637 (ALT 250 FOR IP): Performed by: NURSE PRACTITIONER

## 2023-08-08 PROCEDURE — 6360000004 HC RX CONTRAST MEDICATION: Performed by: INTERNAL MEDICINE

## 2023-08-08 PROCEDURE — 93010 ELECTROCARDIOGRAM REPORT: CPT | Performed by: INTERNAL MEDICINE

## 2023-08-08 PROCEDURE — 99232 SBSQ HOSP IP/OBS MODERATE 35: CPT | Performed by: NURSE PRACTITIONER

## 2023-08-08 PROCEDURE — 99223 1ST HOSP IP/OBS HIGH 75: CPT | Performed by: INTERNAL MEDICINE

## 2023-08-08 PROCEDURE — 6360000002 HC RX W HCPCS: Performed by: INTERNAL MEDICINE

## 2023-08-08 PROCEDURE — C8929 TTE W OR WO FOL WCON,DOPPLER: HCPCS

## 2023-08-08 PROCEDURE — 2580000003 HC RX 258: Performed by: INTERNAL MEDICINE

## 2023-08-08 PROCEDURE — 71045 X-RAY EXAM CHEST 1 VIEW: CPT

## 2023-08-08 PROCEDURE — 85027 COMPLETE CBC AUTOMATED: CPT

## 2023-08-08 RX ORDER — ALPRAZOLAM 0.5 MG/1
1 TABLET ORAL ONCE
Status: COMPLETED | OUTPATIENT
Start: 2023-08-08 | End: 2023-08-08

## 2023-08-08 RX ORDER — LANOLIN ALCOHOL/MO/W.PET/CERES
6 CREAM (GRAM) TOPICAL NIGHTLY PRN
Status: DISCONTINUED | OUTPATIENT
Start: 2023-08-08 | End: 2023-08-10 | Stop reason: HOSPADM

## 2023-08-08 RX ORDER — MAGNESIUM 30 MG
500 TABLET ORAL DAILY
Status: ON HOLD | COMMUNITY
End: 2023-08-10 | Stop reason: HOSPADM

## 2023-08-08 RX ORDER — LANOLIN ALCOHOL/MO/W.PET/CERES
400 CREAM (GRAM) TOPICAL DAILY
Status: DISCONTINUED | OUTPATIENT
Start: 2023-08-08 | End: 2023-08-10 | Stop reason: HOSPADM

## 2023-08-08 RX ORDER — FUROSEMIDE 10 MG/ML
40 INJECTION INTRAMUSCULAR; INTRAVENOUS DAILY
Status: DISCONTINUED | OUTPATIENT
Start: 2023-08-08 | End: 2023-08-10

## 2023-08-08 RX ADMIN — OXYCODONE HYDROCHLORIDE AND ACETAMINOPHEN 250 MG: 500 TABLET ORAL at 08:46

## 2023-08-08 RX ADMIN — FUROSEMIDE 40 MG: 10 INJECTION, SOLUTION INTRAMUSCULAR; INTRAVENOUS at 14:04

## 2023-08-08 RX ADMIN — APIXABAN 5 MG: 5 TABLET, FILM COATED ORAL at 08:46

## 2023-08-08 RX ADMIN — ACETAMINOPHEN 650 MG: 325 TABLET ORAL at 06:56

## 2023-08-08 RX ADMIN — MELATONIN TAB 3 MG 6 MG: 3 TAB at 01:04

## 2023-08-08 RX ADMIN — SODIUM CHLORIDE, PRESERVATIVE FREE 10 ML: 5 INJECTION INTRAVENOUS at 20:22

## 2023-08-08 RX ADMIN — ACETAMINOPHEN 650 MG: 325 TABLET ORAL at 23:46

## 2023-08-08 RX ADMIN — PERFLUTREN 1.5 ML: 6.52 INJECTION, SUSPENSION INTRAVENOUS at 09:01

## 2023-08-08 RX ADMIN — METOPROLOL TARTRATE 12.5 MG: 25 TABLET, FILM COATED ORAL at 01:05

## 2023-08-08 RX ADMIN — EMPAGLIFLOZIN 10 MG: 10 TABLET, FILM COATED ORAL at 14:04

## 2023-08-08 RX ADMIN — ALPRAZOLAM 1 MG: 0.5 TABLET ORAL at 20:19

## 2023-08-08 RX ADMIN — Medication 400 MG: at 17:33

## 2023-08-08 NOTE — PROGRESS NOTES
[facility]   Electrophysiology Progress Note     Date: 8/8/2023  Admit Date: 8/7/2023     Reason for consultation: s/p Ablation     Chief Complaint: No chief complaint on file. History of Present Illness: History obtained from patient and medical record. Keiry Jim is a 50 y.o. female with a history of Ebstein anomaly, Open heart surgery x 4,  Cadaver pulmonic valve replacement 1999, tricuspid valve replacement 2009. Referred by PCP for persistent atrial fibrillation. Family history of complex congenital Heart Disease. S/p Pulm valve replacement, Brito [facility]one repair of Ebstein's anomaly and maze 7/20/222 in [place]      S/p 9/14/2022 Radiofrequency ablation of right atrial scar dependent macro reentrant tachycardia. Selective venography of lower extremity, bilateral and left upper extremity    Interval Hx: Today, she is sitting up in bed asking what the plan for today is. She reports being able to tell when her heart rate was low overnight. She said she debated taking her home dose of Metoprolol because \"it tends to cause problems for me at times\". She denies chest pain, shortness of breath, palpitations or dizziness. She said Dr Chyna Krishnamurthy discussed the potential need for a pacemaker but that he and Dr Carolyn Cleveland would discuss this afternoon with her. She was seen and examined. Clinical notes reviewed. Telemetry reviewed. No new complaints today.      Assessment and Plan:     Persistent Atrial Flutter  - s/p ablation 8/8, complicated by access- prolonged time and attempts- terminated   - 8/8 cardioverted at end of attempted ablation, into \"sinus rhythm with slow recovery of sinus node\"  - sinus jann vs accelerated junctional rhythm overnight rates 35-52, stable BP   - past DCCV 3/6/23  - Metoprolol on hold currently   - Hgb- 12.7    [id number] Score for Atrial Fibrillation Stroke Risk   Risk   Factors  Component Value   C CHF No 0   H HTN Yes 1   A2 Age >= 75 No,  (50 y.o.) 0

## 2023-08-08 NOTE — PROGRESS NOTES
Anticipating RHC and LHC tomorrow per Dr Tolu Sanchez. Verbal orders to hold josee redmond. Will place order for this, and NPO.

## 2023-08-08 NOTE — CONSULTS
[address]  [phone]        I was asked to see patient by Dr. Dayday Roman    Reason for consult:  complex congenital heart disease, heart failure    ASSESSMENT AND PLAN:  Chronic systolic heart failure, likely due to persistent arrhythmia; however lateral TWI on EKG and WMA on echo, possibly also has CAD  Sick sinus syndrome  Longstanding persistent atypical atrial flutter  Ebstein's anomaly s/p several surgeries  S/p tricuspid valve repair  S/p pulmonary valve replacement  Elevated RVSP by echo, possibly pulmonary hypertension  Severe RV dilation and systolic dysfunction  Benign essential HTN  Chest pain concerning for coronary etiology/abnormal echo with WMA/abnormal EKG with new lateral TWI    Plan  -Echo reviewed, RV markedly dilated / depressed function (not a new finding)  -IVC enlarged c/w elevated CVP  -LVEF markedly better just in last 24 hours since DCCV yesterday  -Repeat labs - BNP elevated  -Start IV lasix 40 mg IV daily  -Start Jardiance    -Monitor telemetry closely  -Hold all beta blockers for time being given > 3 sec pauses this am  -Lipid panel    -Will need ischemic w/u prior to discharge  -Hold Eliquis for possible cath tomorrow  -NPO at midnight  -Will reassess in am to determine whether best to proceed with LHC vs dilt drip to lower HR for CTA    -She has a very high likelihood of needing a pacemaker in the near future  -Ideally will wait a while before placing a device given that she had extensive invasive procedure to her heart yesterday  -I discussed plan in detail with Dr. Dayday Roman  -She might prefer to have pacemaker done via epicardial approach by Dr. Rebecca Causey at [facility]    History of Present Illness:  Fam Delgado is a 50 y.o. patient who I know quite well from the office. Long history of complex congenital heart disease as detailed in prior notes.   S/P very long attempt at atrial flutter ablation yesterday, unable to get her out of flutter with ablation, ischemiaWhen compared with ECG of 07-AUG-2023 10:02,Sinus rhythm has replaced Atrial flutterVent. rate has decreased BY  40 BPMQT has shortened   Electrocardiogram, 12-lead    Result Value Ref Range    Ventricular Rate 102 BPM    Atrial Rate 102 BPM    P-R Interval 202 ms    QRS Duration 158 ms    Q-T Interval 412 ms    QTc Calculation (Bazett) 536 ms    R Axis 73 degrees    T Axis -71 degrees    Diagnosis       ** Poor data quality, interpretation may be adversely affectedProbable Atrial flutterRight bundle branch blockT wave abnormality, consider lateral ischemiaAbnormal ECGWhen compared with ECG of 06-MAR-2023 11:52,QT has lengthenedConfirmed by [facility] B[facility] SAVAGE ([id number]) on 8/7/2023 12:09:01 PM       Echo:   Today  Summary   Note, this echo was done one day after patient was cardioverted into normal   sinus rhythm after having been in sustained rate-controlled atrial flutter   for the past several months. MARY done in EP lab yesterday demonstrated LVEF approximately 30-35%. Patient with hx of complex congenital heart disease and multiple cardiac   surgeries, most recently explant of 21year-old mechanical tricuspid valve   with concurrent Cone procedure of native tricuspid valve leaflets combined   with re-do pulmonary valve replacement in 2022 at [facility] by Dr. Lennie Cuevas. Definity was used to assist with endocardial border delineation. Mildly dilated left ventricular cavity size. LVEF   45-50%   Abnormal septal motion likely due to combination of right ventricular   pressure overload, prior surgery, and right bundle branch block. Subtle hypokinesis of apicallateral, apical anterior, midanterior, and   apical walls. Indeterminate diastolic function. RV severely dilated with severely reduced function FAC 9.1%   Avg. RV free wall long. strain= -11.16%   Mild mitral regurgitation. Tricuspid valve repair intact with mean inflow pressure gradient 4 mmHg.    Mild eccentric

## 2023-08-09 ENCOUNTER — APPOINTMENT (OUTPATIENT)
Dept: CT IMAGING | Age: 48
End: 2023-08-09
Attending: INTERNAL MEDICINE
Payer: COMMERCIAL

## 2023-08-09 LAB
ANION GAP SERPL CALCULATED.3IONS-SCNC: 9 MMOL/L (ref 3–16)
BUN SERPL-MCNC: 10 MG/DL (ref 7–20)
CALCIUM SERPL-MCNC: 8.9 MG/DL (ref 8.3–10.6)
CHLORIDE SERPL-SCNC: 102 MMOL/L (ref 99–110)
CO2 SERPL-SCNC: 25 MMOL/L (ref 21–32)
CREAT SERPL-MCNC: 0.5 MG/DL (ref 0.6–1.1)
EKG ATRIAL RATE: 81 BPM
EKG DIAGNOSIS: NORMAL
EKG P-R INTERVAL: 88 MS
EKG Q-T INTERVAL: 426 MS
EKG QRS DURATION: 166 MS
EKG QTC CALCULATION (BAZETT): 494 MS
EKG R AXIS: 57 DEGREES
EKG T AXIS: -39 DEGREES
EKG VENTRICULAR RATE: 81 BPM
GFR SERPLBLD CREATININE-BSD FMLA CKD-EPI: >60 ML/MIN/{1.73_M2}
GLUCOSE SERPL-MCNC: 91 MG/DL (ref 70–99)
MAGNESIUM SERPL-MCNC: 2.2 MG/DL (ref 1.8–2.4)
POTASSIUM SERPL-SCNC: 3.8 MMOL/L (ref 3.5–5.1)
POTASSIUM SERPL-SCNC: 4.1 MMOL/L (ref 3.5–5.1)
SODIUM SERPL-SCNC: 136 MMOL/L (ref 136–145)

## 2023-08-09 PROCEDURE — 93010 ELECTROCARDIOGRAM REPORT: CPT | Performed by: INTERNAL MEDICINE

## 2023-08-09 PROCEDURE — 84132 ASSAY OF SERUM POTASSIUM: CPT

## 2023-08-09 PROCEDURE — 2500000003 HC RX 250 WO HCPCS

## 2023-08-09 PROCEDURE — 99153 MOD SED SAME PHYS/QHP EA: CPT

## 2023-08-09 PROCEDURE — 6360000002 HC RX W HCPCS

## 2023-08-09 PROCEDURE — 71250 CT THORAX DX C-: CPT

## 2023-08-09 PROCEDURE — 2709999900 HC NON-CHARGEABLE SUPPLY

## 2023-08-09 PROCEDURE — C1730 CATH, EP, 19 OR FEW ELECT: HCPCS

## 2023-08-09 PROCEDURE — 33225 L VENTRIC PACING LEAD ADD-ON: CPT | Performed by: INTERNAL MEDICINE

## 2023-08-09 PROCEDURE — C1894 INTRO/SHEATH, NON-LASER: HCPCS

## 2023-08-09 PROCEDURE — 80048 BASIC METABOLIC PNL TOTAL CA: CPT

## 2023-08-09 PROCEDURE — 6370000000 HC RX 637 (ALT 250 FOR IP): Performed by: NURSE PRACTITIONER

## 2023-08-09 PROCEDURE — C1769 GUIDE WIRE: HCPCS

## 2023-08-09 PROCEDURE — 99291 CRITICAL CARE FIRST HOUR: CPT | Performed by: INTERNAL MEDICINE

## 2023-08-09 PROCEDURE — 93460 R&L HRT ART/VENTRICLE ANGIO: CPT

## 2023-08-09 PROCEDURE — 2580000003 HC RX 258: Performed by: INTERNAL MEDICINE

## 2023-08-09 PROCEDURE — 99152 MOD SED SAME PHYS/QHP 5/>YRS: CPT | Performed by: INTERNAL MEDICINE

## 2023-08-09 PROCEDURE — 33225 L VENTRIC PACING LEAD ADD-ON: CPT

## 2023-08-09 PROCEDURE — 2580000003 HC RX 258

## 2023-08-09 PROCEDURE — C1898 LEAD, PMKR, OTHER THAN TRANS: HCPCS

## 2023-08-09 PROCEDURE — 6370000000 HC RX 637 (ALT 250 FOR IP): Performed by: INTERNAL MEDICINE

## 2023-08-09 PROCEDURE — 33206 INSERT HEART PM ATRIAL: CPT

## 2023-08-09 PROCEDURE — 6360000004 HC RX CONTRAST MEDICATION: Performed by: INTERNAL MEDICINE

## 2023-08-09 PROCEDURE — 99152 MOD SED SAME PHYS/QHP 5/>YRS: CPT

## 2023-08-09 PROCEDURE — C1785 PMKR, DUAL, RATE-RESP: HCPCS

## 2023-08-09 PROCEDURE — C1900 LEAD, CORONARY VENOUS: HCPCS

## 2023-08-09 PROCEDURE — 93460 R&L HRT ART/VENTRICLE ANGIO: CPT | Performed by: INTERNAL MEDICINE

## 2023-08-09 PROCEDURE — 83735 ASSAY OF MAGNESIUM: CPT

## 2023-08-09 PROCEDURE — 33208 INSRT HEART PM ATRIAL & VENT: CPT | Performed by: INTERNAL MEDICINE

## 2023-08-09 PROCEDURE — 99232 SBSQ HOSP IP/OBS MODERATE 35: CPT | Performed by: NURSE PRACTITIONER

## 2023-08-09 PROCEDURE — C1725 CATH, TRANSLUMIN NON-LASER: HCPCS

## 2023-08-09 PROCEDURE — C1751 CATH, INF, PER/CENT/MIDLINE: HCPCS

## 2023-08-09 RX ORDER — SODIUM CHLORIDE 0.9 % (FLUSH) 0.9 %
5-40 SYRINGE (ML) INJECTION EVERY 12 HOURS SCHEDULED
Status: DISCONTINUED | OUTPATIENT
Start: 2023-08-09 | End: 2023-08-10 | Stop reason: HOSPADM

## 2023-08-09 RX ORDER — ACETAMINOPHEN 325 MG/1
650 TABLET ORAL EVERY 4 HOURS PRN
Status: DISCONTINUED | OUTPATIENT
Start: 2023-08-09 | End: 2023-08-09 | Stop reason: SDUPTHER

## 2023-08-09 RX ORDER — DIPHENHYDRAMINE HCL 25 MG
25 TABLET ORAL EVERY 6 HOURS PRN
Status: DISCONTINUED | OUTPATIENT
Start: 2023-08-08 | End: 2023-08-10 | Stop reason: HOSPADM

## 2023-08-09 RX ORDER — HYDROMORPHONE HYDROCHLORIDE 2 MG/ML
0.5 INJECTION, SOLUTION INTRAMUSCULAR; INTRAVENOUS; SUBCUTANEOUS EVERY 5 MIN PRN
Status: DISCONTINUED | OUTPATIENT
Start: 2023-08-09 | End: 2023-08-10 | Stop reason: HOSPADM

## 2023-08-09 RX ORDER — SODIUM CHLORIDE 0.9 % (FLUSH) 0.9 %
5-40 SYRINGE (ML) INJECTION PRN
Status: DISCONTINUED | OUTPATIENT
Start: 2023-08-09 | End: 2023-08-10 | Stop reason: HOSPADM

## 2023-08-09 RX ORDER — ONDANSETRON 2 MG/ML
4 INJECTION INTRAMUSCULAR; INTRAVENOUS
Status: ACTIVE | OUTPATIENT
Start: 2023-08-09 | End: 2023-08-10

## 2023-08-09 RX ORDER — GINSENG 100 MG
CAPSULE ORAL 2 TIMES DAILY
Status: DISCONTINUED | OUTPATIENT
Start: 2023-08-09 | End: 2023-08-10 | Stop reason: HOSPADM

## 2023-08-09 RX ORDER — SODIUM CHLORIDE 9 MG/ML
INJECTION, SOLUTION INTRAVENOUS PRN
Status: DISCONTINUED | OUTPATIENT
Start: 2023-08-09 | End: 2023-08-10 | Stop reason: HOSPADM

## 2023-08-09 RX ORDER — POTASSIUM CHLORIDE 20 MEQ/1
40 TABLET, EXTENDED RELEASE ORAL ONCE
Status: COMPLETED | OUTPATIENT
Start: 2023-08-09 | End: 2023-08-09

## 2023-08-09 RX ORDER — LORAZEPAM 1 MG/1
1 TABLET ORAL ONCE
Status: DISCONTINUED | OUTPATIENT
Start: 2023-08-09 | End: 2023-08-10 | Stop reason: HOSPADM

## 2023-08-09 RX ORDER — MEPERIDINE HYDROCHLORIDE 25 MG/ML
12.5 INJECTION INTRAMUSCULAR; INTRAVENOUS; SUBCUTANEOUS EVERY 5 MIN PRN
Status: DISCONTINUED | OUTPATIENT
Start: 2023-08-09 | End: 2023-08-10 | Stop reason: HOSPADM

## 2023-08-09 RX ADMIN — METOPROLOL TARTRATE 12.5 MG: 25 TABLET, FILM COATED ORAL at 20:30

## 2023-08-09 RX ADMIN — OXYCODONE HYDROCHLORIDE AND ACETAMINOPHEN 250 MG: 500 TABLET ORAL at 09:09

## 2023-08-09 RX ADMIN — IOHEXOL 35 ML: 350 INJECTION, SOLUTION INTRAVENOUS at 16:49

## 2023-08-09 RX ADMIN — BACITRACIN: 500 OINTMENT TOPICAL at 12:29

## 2023-08-09 RX ADMIN — ACETAMINOPHEN 650 MG: 325 TABLET ORAL at 20:31

## 2023-08-09 RX ADMIN — Medication 400 MG: at 09:09

## 2023-08-09 RX ADMIN — SODIUM CHLORIDE, PRESERVATIVE FREE 10 ML: 5 INJECTION INTRAVENOUS at 20:30

## 2023-08-09 RX ADMIN — SODIUM CHLORIDE, PRESERVATIVE FREE 10 ML: 5 INJECTION INTRAVENOUS at 09:16

## 2023-08-09 RX ADMIN — POTASSIUM CHLORIDE 40 MEQ: 1500 TABLET, EXTENDED RELEASE ORAL at 09:09

## 2023-08-09 ASSESSMENT — PAIN DESCRIPTION - DESCRIPTORS: DESCRIPTORS: ACHING

## 2023-08-09 ASSESSMENT — PAIN DESCRIPTION - LOCATION
LOCATION: HIP
LOCATION: CHEST

## 2023-08-09 ASSESSMENT — PAIN SCALES - GENERAL
PAINLEVEL_OUTOF10: 4
PAINLEVEL_OUTOF10: 0
PAINLEVEL_OUTOF10: 0
PAINLEVEL_OUTOF10: 3

## 2023-08-09 ASSESSMENT — PAIN DESCRIPTION - PAIN TYPE: TYPE: SURGICAL PAIN

## 2023-08-09 ASSESSMENT — PAIN DESCRIPTION - FREQUENCY: FREQUENCY: CONTINUOUS

## 2023-08-09 ASSESSMENT — PAIN DESCRIPTION - ONSET: ONSET: ON-GOING

## 2023-08-09 ASSESSMENT — PAIN DESCRIPTION - ORIENTATION: ORIENTATION: LEFT

## 2023-08-09 NOTE — PROGRESS NOTES
[facility] HEART [place]     Daily Progress Note      Admit Date:  8/7/2023      ASSESSMENT AND PLAN:  Acute on Chronic systolic heart failure, likely due to persistent arrhythmia; however lateral TWI on EKG and WMA on echo, possibly also has CAD  Sick sinus syndrome  Longstanding persistent atypical atrial flutter  Nonsustained ventricular tachycardia  Ebstein's anomaly s/p several surgeries  S/p tricuspid valve repair  S/p pulmonary valve replacement  Elevated RVSP by echo, possibly pulmonary hypertension  Severe RV dilation and systolic dysfunction  Benign essential HTN  Chest pain concerning for coronary etiology/abnormal echo with WMA/abnormal EKG with new lateral TWI  Abnormal LFTs/elevated INR (not on coumadin). Most likely due to chronic right heart failure. No clinical evidence of decompensated cirrhosis. Will plan to repeat these as an outpatient in the near future. Plan  -Given the overall complexity of her situation I personally discussed her case via cell phone with Dr. Cris Romano who had done her surgery last year, he agrees RHC is safe to do if necessary, and also agrees that atrial pacing will be of benefit to her  -RHC/LHC today, possible stent  -Possible pacemaker today  -Repeat BNP tomorrow   -Continue IV lasix 40 mg IV daily  - Closely monitor renal function, 'lytes, telemetry while aggressively IV diuresing.  - Replete K+ prior to cath  - Hold Eliquis until after cath  - Will consider adding spironolactone later today pending results of cath versus deferring till tomorrow with new labs.   - Jardiance d/c'd and added to her allergy list     -Monitor telemetry closely  -Hold all beta blockers for time being given intermittent bradycardia and severe pauses on 8/8  -Lipid panel panding    -I discussed plan in detail with Dr. Jocelyne Rodriguez Procedure Note     Total critical care time: 35 minutes    Due to a high probability of clinically significant, life threatening deterioration, the

## 2023-08-09 NOTE — PROGRESS NOTES
Patient went into a 16 beat V Tach @0320 this morning. Patient was asymptomatic. Has had a frequent run of bigeminal PVC's starting after V-Tach. Patient also had run of Atrial Tach @2366. Patient complained of tightness/pain in lower back and groin area. Paged cardiology, no new interventions at this time. Will continue to monitor.

## 2023-08-09 NOTE — PROGRESS NOTES
[facility]   Electrophysiology Progress Note     Date: 8/9/2023  Admit Date: 8/7/2023     Reason for consultation: s/p Ablation     Chief Complaint: No chief complaint on file. History of Present Illness: History obtained from patient and medical record. Jameel Moran is a 50 y.o. female with a history of Ebstein anomaly, Open heart surgery x 4,  Cadaver pulmonic valve replacement 1999, tricuspid valve replacement 2009. Referred by PCP for persistent atrial fibrillation. Family history of complex congenital Heart Disease. 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 Selective venography of lower extremity, bilateral and left upper extremity    Interval Hx: Today, she had a long discussion with Dr James Song about the plan for today. He spoke with her surgeon in [place] and Dr Lin Arriaga. She will have a RHC today, with possible pacemaker implant. Yesterday she described having an allergic reaction to Jardiance, most of her symptoms have recovered but she still complains of bilateral hip \"soreness\". She is very nervous about the cardiac cath and possible pacemaker. She denies any chest pain, dyspnea, dizziness. She does feel palpitations at times. She was seen and examined. Clinical notes reviewed. Telemetry reviewed. No new complaints today.      Assessment and Plan:     Persistent Atrial Flutter  - tele overnight showed short run Atriall tachycardia  - s/p ablation 8/8, complicated by access- prolonged time and attempts- terminated   - 8/8 cardioverted at end of attempted ablation, into \"sinus rhythm with slow recovery of sinus node\"  - accelerated junctional rhythm (8/8)  - Metoprolol on hold currently   - K- 3.8 with replacement  - Mg- 2.2 , Cr-0.5   - Eliquis being held for right and left heart cath, may resume this evening   -

## 2023-08-09 NOTE — PROGRESS NOTES
Pt politely declining lasix dose this AM as she remains NPO pending unknown CCL procedure time, her wt is down ~3kgs from yesterday and her K+ requires replacement. Dr. Iliana Park updated, stated ok for today. Additionally, pt increasingly anxious about today's procdure, and is requesting something for anxiety. Dr. Iliana Park gave order for 1mg Ativan PO as a single dose. Order placed.     Carlos Lopez, MISAEL  8/9/2023

## 2023-08-09 NOTE — PROGRESS NOTES
Dr. Viktor Souza bedside to discuss plan of care/timing of permanent pacemaker options. Pt to discuss with spouse and let RN know of decision.     Reece Mcbride, MISAEL  8/9/2023

## 2023-08-09 NOTE — PROCEDURES
[facility]     Electrophysiology Procedure Note       Date of Procedure: 8/9/2023  Patient's Name: Rosita Perez  YOB: 1975   Medical Record Number: [id number]  Referring Physician: Lien Danielle MD  Procedure Performed by: Lenore Knowles MD    Procedures performed:  Insertion of dual chamber pacemaker under fluoroscopic guidance   IV sedation    Fentanyl 600 mcg and Versed 12 Mg  Benadryl 50 mg  Zofran 4mg  Sedation was administered by trained nursing staff under direct physician supervision  Programming and analysis of dual chamber pacemaker    Ins/Out 100 mL / 0 mL  EBL less than 20 mL     Indication of the procedure:   Sinus node dysfunction    HPI:  Rosita Perez is a 50 y.o. female history of Ebstein's anomaly, cone procedure as a revision for previously implanted mechanical tricuspid valve, recurrent atrial flutter, recently underwent catheter ablation where she was found to have dilated right atrium, significant scarring except for posterior wall and Darcie annular tricuspid valve area, required cardioversion after attempts at ablation and transseptal access, has sinus node dysfunction with postconversion pause and junctional rhythm, referred for pacemaker implantation    Details of procedure: The patient was brought to the electrophysiology laboratory in stable condition. The patient was in a fasting and non-sedated state. The risks, benefits and alternatives of the procedure were discussed with the patient and . The risks including, but not limited to, the risks of vascular injury, bleeding, infection, device malfunction, lead dislodgement, radiation exposure, injury to cardiac and surrounding structures (including pneumothorax), stroke, myocardial infarction and death were discussed in detail. Patient opted to proceed with the device implantation. Written informed consent was signed and placed in the chart.   Ancef 2 G was given as prophylactic lead demonstrated good stability with gentle retraction and advancement under fluoroscopy. Testing confirmed excellent lead characteristics outpatient with low sensing of approximately 0.5-1. I elected to keep this lead position due to excellent lead stability, low threshold for pacing (expecting that she will have significant RA pacing on beta-blocker) and after reviewing the electrophysiology study with voltage map demonstrating scar throughout the right atrium except for areas around the tricuspid annulus and low posterior wall that would not typically be suitable for lead position. The 7 Fr sheath was peeled away. The lead was anchored in place using the suture sleeve and interrupted 0-silk. Pocket    Additional lidocaine was administered. A pocket was created using electrocautery and blunt dissection. The pocket was washed with antibiotic solution. The leads were then connected to the new dual chamber pulse generator which was then placed into the pocket. The device was anchored to the underlying muscle using 0-silk. The pocket was then closed in three separate subcutaneous layers using 2-0 & 3-0 vicryl and subcuticular layer using 4-0 vicryl. Steri strips were placed over the incision site. A dressing was over the wound. All sponge and needle counts were reported as correct at the end of the procedure. The patient tolerated the procedure well and there were no complications. Post-sedation evaluation was completed. Patient was transported to the holding area in stable condition.      Lead and device information:           Assessment:  Successful implantation of a dual chamber pacemaker (atrial and coronary sinus pacing leads)  Large mid lateral CS branch with distal bifurcation that had elevated thresholds, anterolateral CS branch (terminal branch of CS similar to AIV) that was final CS lead position    Plan:   PA and lateral chest xray in AM   Device interrogation in AM  Resume metoprolol

## 2023-08-09 NOTE — PROGRESS NOTES
Per Dr. Darryle Pines, ok to resume metoprolol tonight and follow Dr. Nohemi Merritt for restarting Eliquis tomorrow along with completing PA/Lateral CXR in AM.     Vinay Early RN  8/9/2023

## 2023-08-09 NOTE — PROGRESS NOTES
Pt transported to CCL for pacemaker placement at this time. CCL team made aware of TR band with 5mL inflated at time of departure. TR band syringe sent with pt.      Mallory Vang RN  8/9/2023

## 2023-08-09 NOTE — OP NOTE
[facility] Operative Note     PROCEDURE SUMMARY   Procedure [facility]/RH   Indication CARDIOMYOPATHY   Consent Obtained   Access RRA, RIJ   US US guidance used to determine artery patency, size (>2mm), anatomic variations and ideal puncture location. Real-time US utilized concurrent with vascular needle entry into artery. Image(s) permanently recorded and reported in chart. Bleed Risk Low   Sedation Minimal conscious sedation for patient comfort. Independent trained observer pushed medications at my direction. We monitored the patient's level of consciousness and vital signs/physiologic status throughout the procedure duration (see start and stop times above, as well as medication dosages).    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 Isatu Carrion

## 2023-08-09 NOTE — PROGRESS NOTES
Pt returned from CCL following PPM placement. Pt has swathe in place around L arm. Incision site WNL, surgical dressing in place. CCL RNs updated that previous TR band that was on R radial puncture site was removed immediately prior to coming to CVU. Site currently JULISSA, mild edema but soft, no hematoma/bleeding. Site cleansed and tegaderm applied. Education updated on BUE restrictions r/t fresh PPM and also TR band site. Bed in lowest position. Call light within reach. Spouse at bedside, previously updated per Dr. Ferny Gallo.      Aarti Lord RN  8/9/2023

## 2023-08-09 NOTE — PROGRESS NOTES
Removed 2mL air from TR band at 1200, however site bled freely within 1-2 minutes following removal. TR band re-inflated with 4mL to original post-CCL amount (9mL) at this time. Site visible under TR band, bleeding ceased, site soft above/below TR band. Will monitor site.     Mallory Vang RN  8/9/2023

## 2023-08-09 NOTE — PROGRESS NOTES
Pt returned from [address] [place] [address]. Report rec'd bedside. R IJ sheath removal site WNL. R radial TR band in place, per report 9mL air dwelling, ok to start removing at 1130. Site WNL. Pt denies complaints, spouse let back into room. Call light within reach, bed in lowest position.      Vinay Early RN  8/9/2023

## 2023-08-10 ENCOUNTER — APPOINTMENT (OUTPATIENT)
Dept: GENERAL RADIOLOGY | Age: 48
End: 2023-08-10
Attending: INTERNAL MEDICINE
Payer: COMMERCIAL

## 2023-08-10 ENCOUNTER — NURSE ONLY (OUTPATIENT)
Dept: CARDIOLOGY CLINIC | Age: 48
End: 2023-08-10

## 2023-08-10 VITALS
DIASTOLIC BLOOD PRESSURE: 65 MMHG | HEART RATE: 69 BPM | TEMPERATURE: 98.3 F | BODY MASS INDEX: 26.09 KG/M2 | OXYGEN SATURATION: 96 % | HEIGHT: 61 IN | WEIGHT: 138.2 LBS | RESPIRATION RATE: 14 BRPM | SYSTOLIC BLOOD PRESSURE: 113 MMHG

## 2023-08-10 DIAGNOSIS — Z95.0 PACEMAKER: Primary | ICD-10-CM

## 2023-08-10 DIAGNOSIS — I49.5 SICK SINUS SYNDROME (HCC): ICD-10-CM

## 2023-08-10 DIAGNOSIS — I48.4 ATYPICAL ATRIAL FLUTTER (HCC): ICD-10-CM

## 2023-08-10 DIAGNOSIS — I48.19 PERSISTENT ATRIAL FIBRILLATION WITH RVR (HCC): ICD-10-CM

## 2023-08-10 LAB
ALBUMIN SERPL-MCNC: 4 G/DL (ref 3.4–5)
ALBUMIN/GLOB SERPL: 1.5 {RATIO} (ref 1.1–2.2)
ALP SERPL-CCNC: 53 U/L (ref 40–129)
ALT SERPL-CCNC: 50 U/L (ref 10–40)
ANION GAP SERPL CALCULATED.3IONS-SCNC: 8 MMOL/L (ref 3–16)
AST SERPL-CCNC: 42 U/L (ref 15–37)
BILIRUB SERPL-MCNC: 1.3 MG/DL (ref 0–1)
BUN SERPL-MCNC: 7 MG/DL (ref 7–20)
CALCIUM SERPL-MCNC: 8.8 MG/DL (ref 8.3–10.6)
CHLORIDE SERPL-SCNC: 102 MMOL/L (ref 99–110)
CO2 SERPL-SCNC: 27 MMOL/L (ref 21–32)
CREAT SERPL-MCNC: 0.6 MG/DL (ref 0.6–1.1)
DEPRECATED RDW RBC AUTO: 13.4 % (ref 12.4–15.4)
GFR SERPLBLD CREATININE-BSD FMLA CKD-EPI: >60 ML/MIN/{1.73_M2}
GLUCOSE SERPL-MCNC: 103 MG/DL (ref 70–99)
HCT VFR BLD AUTO: 36.1 % (ref 36–48)
HGB BLD-MCNC: 12.4 G/DL (ref 12–16)
MCH RBC QN AUTO: 31.6 PG (ref 26–34)
MCHC RBC AUTO-ENTMCNC: 34.2 G/DL (ref 31–36)
MCV RBC AUTO: 92.4 FL (ref 80–100)
NT-PROBNP SERPL-MCNC: 1313 PG/ML (ref 0–124)
PLATELET # BLD AUTO: 150 K/UL (ref 135–450)
PMV BLD AUTO: 8.6 FL (ref 5–10.5)
POTASSIUM SERPL-SCNC: 3.6 MMOL/L (ref 3.5–5.1)
PROT SERPL-MCNC: 6.6 G/DL (ref 6.4–8.2)
RBC # BLD AUTO: 3.91 M/UL (ref 4–5.2)
SODIUM SERPL-SCNC: 137 MMOL/L (ref 136–145)
WBC # BLD AUTO: 6.7 K/UL (ref 4–11)

## 2023-08-10 PROCEDURE — 80053 COMPREHEN METABOLIC PANEL: CPT

## 2023-08-10 PROCEDURE — 71046 X-RAY EXAM CHEST 2 VIEWS: CPT

## 2023-08-10 PROCEDURE — 2580000003 HC RX 258: Performed by: INTERNAL MEDICINE

## 2023-08-10 PROCEDURE — 6370000000 HC RX 637 (ALT 250 FOR IP): Performed by: INTERNAL MEDICINE

## 2023-08-10 PROCEDURE — 6370000000 HC RX 637 (ALT 250 FOR IP): Performed by: NURSE PRACTITIONER

## 2023-08-10 PROCEDURE — 83880 ASSAY OF NATRIURETIC PEPTIDE: CPT

## 2023-08-10 PROCEDURE — 99233 SBSQ HOSP IP/OBS HIGH 50: CPT | Performed by: INTERNAL MEDICINE

## 2023-08-10 PROCEDURE — 85027 COMPLETE CBC AUTOMATED: CPT

## 2023-08-10 RX ORDER — METOPROLOL SUCCINATE 50 MG/1
25 TABLET, EXTENDED RELEASE ORAL DAILY
Status: DISCONTINUED | OUTPATIENT
Start: 2023-08-10 | End: 2023-08-10 | Stop reason: HOSPADM

## 2023-08-10 RX ORDER — ROSUVASTATIN CALCIUM 10 MG/1
10 TABLET, COATED ORAL NIGHTLY
Status: DISCONTINUED | OUTPATIENT
Start: 2023-08-10 | End: 2023-08-10 | Stop reason: HOSPADM

## 2023-08-10 RX ORDER — FUROSEMIDE 20 MG/1
20 TABLET ORAL DAILY
Status: DISCONTINUED | OUTPATIENT
Start: 2023-08-10 | End: 2023-08-10 | Stop reason: HOSPADM

## 2023-08-10 RX ORDER — METOPROLOL SUCCINATE 25 MG/1
25 TABLET, EXTENDED RELEASE ORAL DAILY
Qty: 30 TABLET | Refills: 3 | Status: SHIPPED | OUTPATIENT
Start: 2023-08-11

## 2023-08-10 RX ORDER — ROSUVASTATIN CALCIUM 10 MG/1
10 TABLET, COATED ORAL NIGHTLY
Qty: 30 TABLET | Refills: 3 | Status: SHIPPED | OUTPATIENT
Start: 2023-08-10

## 2023-08-10 RX ORDER — SPIRONOLACTONE 25 MG/1
25 TABLET ORAL DAILY
Qty: 30 TABLET | Refills: 3 | Status: SHIPPED | OUTPATIENT
Start: 2023-08-10

## 2023-08-10 RX ORDER — FUROSEMIDE 20 MG/1
20 TABLET ORAL DAILY
Qty: 60 TABLET | Refills: 3 | Status: SHIPPED | OUTPATIENT
Start: 2023-08-11

## 2023-08-10 RX ORDER — DIGOXIN 250 MCG
250 TABLET ORAL DAILY
Qty: 30 TABLET | Refills: 3 | Status: SHIPPED | OUTPATIENT
Start: 2023-08-11

## 2023-08-10 RX ORDER — DIGOXIN 125 MCG
250 TABLET ORAL DAILY
Status: DISCONTINUED | OUTPATIENT
Start: 2023-08-10 | End: 2023-08-10 | Stop reason: HOSPADM

## 2023-08-10 RX ORDER — SPIRONOLACTONE 25 MG/1
25 TABLET ORAL DAILY
Status: DISCONTINUED | OUTPATIENT
Start: 2023-08-10 | End: 2023-08-10 | Stop reason: HOSPADM

## 2023-08-10 RX ADMIN — ACETAMINOPHEN 650 MG: 325 TABLET ORAL at 08:06

## 2023-08-10 RX ADMIN — OXYCODONE HYDROCHLORIDE AND ACETAMINOPHEN 250 MG: 500 TABLET ORAL at 08:09

## 2023-08-10 RX ADMIN — SODIUM CHLORIDE, PRESERVATIVE FREE 10 ML: 5 INJECTION INTRAVENOUS at 08:14

## 2023-08-10 RX ADMIN — ACETAMINOPHEN 650 MG: 325 TABLET ORAL at 01:46

## 2023-08-10 RX ADMIN — METOPROLOL SUCCINATE 25 MG: 50 TABLET, EXTENDED RELEASE ORAL at 09:45

## 2023-08-10 RX ADMIN — Medication 400 MG: at 08:09

## 2023-08-10 RX ADMIN — SODIUM CHLORIDE, PRESERVATIVE FREE 10 ML: 5 INJECTION INTRAVENOUS at 08:15

## 2023-08-10 RX ADMIN — DIGOXIN 250 MCG: 125 TABLET ORAL at 09:45

## 2023-08-10 RX ADMIN — FUROSEMIDE 20 MG: 20 TABLET ORAL at 09:46

## 2023-08-10 RX ADMIN — MELATONIN TAB 3 MG 6 MG: 3 TAB at 01:46

## 2023-08-10 RX ADMIN — SODIUM CHLORIDE, PRESERVATIVE FREE 10 ML: 5 INJECTION INTRAVENOUS at 08:16

## 2023-08-10 ASSESSMENT — PAIN SCALES - GENERAL
PAINLEVEL_OUTOF10: 0
PAINLEVEL_OUTOF10: 0
PAINLEVEL_OUTOF10: 6

## 2023-08-10 ASSESSMENT — PAIN DESCRIPTION - PAIN TYPE: TYPE: SURGICAL PAIN

## 2023-08-10 ASSESSMENT — PAIN DESCRIPTION - ONSET: ONSET: ON-GOING

## 2023-08-10 ASSESSMENT — PAIN DESCRIPTION - ORIENTATION: ORIENTATION: LEFT

## 2023-08-10 ASSESSMENT — PAIN DESCRIPTION - DESCRIPTORS: DESCRIPTORS: THROBBING

## 2023-08-10 ASSESSMENT — PAIN DESCRIPTION - FREQUENCY: FREQUENCY: CONTINUOUS

## 2023-08-10 ASSESSMENT — PAIN DESCRIPTION - LOCATION: LOCATION: CHEST

## 2023-08-10 NOTE — PLAN OF CARE
Problem: Discharge Planning  Goal: Discharge to home or other facility with appropriate resources  Outcome: Progressing     Problem: ABCDS Injury Assessment  Goal: Absence of physical injury  Outcome: Progressing     Problem: Safety - Adult  Goal: Free from fall injury  Outcome: Progressing     Problem: Pain  Goal: Verbalizes/displays adequate comfort level or baseline comfort level  Outcome: Progressing     Electronically signed by Nithya Finnegan RN on 8/10/2023 at 3:18 AM

## 2023-08-10 NOTE — PROGRESS NOTES
Metoprolol restarted tonight and administered at 2030. Before administration BP was 114/58 and HR was 83. Pt called out with complaints of a feeling of heaviness in her head. Pt now paced with each beat and BP 86/49 upon recheck. EP called. No new orders at this time. Will continue to encourage fluids and will continue to monitor.

## 2023-08-10 NOTE — PROGRESS NOTES
[facility]     Daily Progress Note      Admit Date:  8/7/2023    ASSESSMENT AND PLAN:  Acute on Chronic systolic heart failure, likely due to persistent arrhythmia  Sick sinus syndrome now s/p pacemaker 8/9/2023 with RA and CS leads  Longstanding persistent atypical atrial flutter s/p surgical ablation 8/2022 in [place], s/p catheter ablation x 2 here, most recently 8/8, unable to ablate this time. 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 Pericardium appears normal  Severe RV dilation and systolic/diastolic dysfunction  Benign essential HTN  CAD in native artery - 70% stenosis in a circumflex that is too small to stent. Mild atherosclerosis. Lipid panel paneding  Abnormal LFTs/elevated INR (not on coumadin). Most likely due to chronic right heart failure. No clinical evidence of decompensated cirrhosis. Will plan to repeat these as an outpatient in the near future. She is now s/p pacemaker and can safely resume beta blocker to suppress her atrial arrhythmias as well as improve chances that she will improve her systolic function    She has \"pseudoconstriction\" due to her markedly dilated and weak RV. CT shows no pericardial thickening or calcification thus no indication for pericardial stripping surgery. She will need ongoing medical management to improve her hemodynamics. She has severe biventricular diastolic dysfunction which will be a challenge to treat given that she gets hypotensive with aggressive diuretics. Her kidney function has been normal and she would likely improve her hemodynamics significantly with digoxin.         Plan    Repeat BNP today    Repeat CMP today to re-eval rosuvastatin  10 mg Oral Nightly    digoxin  250 mcg Oral Daily    sodium chloride flush  5-40 mL IntraVENous 2 times per day    LORazepam  1 mg Oral Once    sodium chloride flush  5-40 mL IntraVENous 2 times per day    bacitracin   Topical BID    sodium chloride flush  5-40 mL IntraVENous 2 times per day    sodium chloride flush  5-40 mL IntraVENous 2 times per day    sodium chloride flush  5-40 mL IntraVENous 2 times per day    apixaban  5 mg Oral BID    magnesium oxide  400 mg Oral Daily    vitamin C  250 mg Oral Daily    sodium chloride flush  5-40 mL IntraVENous 2 times per day      sodium chloride      sodium chloride      sodium chloride      sodium chloride      sodium chloride       sodium chloride flush, sodium chloride, meperidine, HYDROmorphone, ondansetron, diphenhydrAMINE, iopamidol, sodium chloride flush, sodium chloride, sodium chloride flush, sodium chloride, sodium chloride flush, sodium chloride, sodium chloride flush, sodium chloride, melatonin, sodium chloride flush, phenol, sodium chloride flush, acetaminophen    Lab Data:  CBC:   Recent Labs     08/08/23  1148 08/10/23  0924   WBC 13.3* 6.7   HGB 12.7 12.4   HCT 38.4 36.1   MCV 93.3 92.4    150     BMP:   Recent Labs     08/08/23  1148 08/09/23  0504 08/09/23  1747 08/10/23  0924    136  --  137   K 3.7 3.8 4.1 3.6    102  --  102   CO2 24 25  --  27   BUN 9 10  --  7   CREATININE 0.6 0.5*  --  0.6     LIVER PROFILE:   Recent Labs     08/08/23  1148 08/10/23  0924   AST 43* 42*   ALT 40 50*   BILITOT 0.7 1.3*   ALKPHOS 48 53     PT/INR:   Recent Labs     08/08/23  1148   PROTIME 15.7*   INR 1.25*     APTT: No results for input(s): APTT in the last 72 hours. BNP:    Recent Labs     08/08/23  1148 08/10/23  0924   PROBNP 939* 1,313*       LIPIDS: No results found for: CHOL, TRIG, HDL, LDLCALC, LABVLDL  Last LDL per patient's MyChart was 120    IMAGING:     Cath - images/hemodynamics personally reviewed.   Small circumflex with

## 2023-08-10 NOTE — PROGRESS NOTES
Seen by cardiology and EP  Discharge orders noted  Will dc as ordered  VS stable  Tolerated all po meds this am

## 2023-08-11 ENCOUNTER — PATIENT MESSAGE (OUTPATIENT)
Dept: CARDIOLOGY CLINIC | Age: 48
End: 2023-08-11

## 2023-08-11 DIAGNOSIS — Z95.0 PACEMAKER: Primary | ICD-10-CM

## 2023-08-14 NOTE — TELEPHONE ENCOUNTER
Called and spoke with Patient. Advised her of the results of her transmission. States she didn't feel anything until after her Post op check. Then when she went home from the hospital the fist night was a little rough. Now the  stimulation is every day at 1:30 pm. Could possibly capture management. Explained that it was not urgent if it was tolerable she could wait until 8/24/2023 to come into the office. She is still unable to drive and lives 3 hours away. She will keep her appointment on 8/24/2023. She will have chest x-ray then see Dr. Bard Osorio and then have her device interrogated and wound site check. Any changes she will call the office and we can get her in more urgently for a device check.      Thanks

## 2023-08-21 NOTE — PROGRESS NOTES
MARY 9/14/22  Summary   Color flow and doppler were performed. Global left ventricular function is moderately decreased with ejection   fraction estimated from 30 % to 35 %. There is no evidence of mass or thrombus in the left atrium or appendage. The left atrium is dilated. Likely ligated appendage with small flow jet. Plaque is noted in the thoracic aorta. The right ventricle is enlarged. Right ventricular systolic function is moderate to severely reduced . s/p repair. Mild tricuspid regurgitation. MARY 7/7/22   Probable thrombosis of mechanical On-X tricuspid valve. Only one disc is   seen, and the disc seen has reduced motion. Severe tricuspid stenosis by Doppler   Markedly dilated RA with spontaneous contrast.   RV severely enlarged with mildly reduced function. Pulmonary homograft not well seen, but suspect severe [place] given degree of RV   dilation. Left-sided chambers normal in size and function. ISCHEMIC EVAL  --------------------------  Cath 8/2023 - normal coronaries. Elevated RVEDP/LVEDP concerning for possible constriction    Chest CT - no pericardial calification or thickening    Holter 12/19/23 26 days     14 Day Holter: 6/16-6/29/23  100% Atrial fibrillation/ Flutter, rate is controlled, 1 NSVT 3 beats, symptoms with Afib rate controlled    Outside/Care everywhere records Reviewed  Labs Reviewed  Prior Imaging, ekg, cath, echo reviewed when available  Medications reviewed  Old Notes reviewed  ASSESSMENT AND PLAN     1. Congenital heart disease, Ebstein's, s/p mechanical TR and homograft PVR, now s/p mechanical valve explant, [facility] procedure and re-do PVR at [facility] in 7/2022. 2.PAF/atypical flutter     3. Chronic systolic heart failure. Severe RV dilation    4.  Allergy to medication - unsure which one    Plan  -I called Dr. Mary Kay Sadler to discuss case and to see if it would be safe to try ATP to overdrive pace her  -ATP was done and unsuccesful  -Will cardiovert today, plan to

## 2023-08-24 ENCOUNTER — PATIENT MESSAGE (OUTPATIENT)
Dept: CARDIOLOGY CLINIC | Age: 48
End: 2023-08-24

## 2023-08-24 ENCOUNTER — HOSPITAL ENCOUNTER (OUTPATIENT)
Dept: CARDIAC CATH/INVASIVE PROCEDURES | Age: 48
Discharge: HOME | End: 2023-08-24
Attending: INTERNAL MEDICINE | Admitting: INTERNAL MEDICINE
Payer: COMMERCIAL

## 2023-08-24 ENCOUNTER — OFFICE VISIT (OUTPATIENT)
Dept: CARDIOLOGY CLINIC | Age: 48
End: 2023-08-24
Payer: COMMERCIAL

## 2023-08-24 ENCOUNTER — NURSE ONLY (OUTPATIENT)
Dept: CARDIOLOGY CLINIC | Age: 48
End: 2023-08-24

## 2023-08-24 VITALS
SYSTOLIC BLOOD PRESSURE: 110 MMHG | DIASTOLIC BLOOD PRESSURE: 64 MMHG | BODY MASS INDEX: 26.36 KG/M2 | OXYGEN SATURATION: 98 % | HEART RATE: 100 BPM | HEIGHT: 61 IN | WEIGHT: 139.6 LBS

## 2023-08-24 VITALS
SYSTOLIC BLOOD PRESSURE: 104 MMHG | HEART RATE: 68 BPM | OXYGEN SATURATION: 100 % | RESPIRATION RATE: 14 BRPM | DIASTOLIC BLOOD PRESSURE: 68 MMHG

## 2023-08-24 DIAGNOSIS — I48.3 TYPICAL ATRIAL FLUTTER (HCC): ICD-10-CM

## 2023-08-24 DIAGNOSIS — I48.4 ATYPICAL ATRIAL FLUTTER (HCC): ICD-10-CM

## 2023-08-24 DIAGNOSIS — Z95.0 PACEMAKER: Primary | ICD-10-CM

## 2023-08-24 DIAGNOSIS — I48.19 PERSISTENT ATRIAL FIBRILLATION WITH RVR (HCC): Primary | ICD-10-CM

## 2023-08-24 DIAGNOSIS — I49.5 SICK SINUS SYNDROME (HCC): ICD-10-CM

## 2023-08-24 DIAGNOSIS — I50.22 CHRONIC SYSTOLIC HEART FAILURE (HCC): ICD-10-CM

## 2023-08-24 DIAGNOSIS — I48.19 PERSISTENT ATRIAL FIBRILLATION WITH RVR (HCC): ICD-10-CM

## 2023-08-24 LAB
EKG ATRIAL RATE: 80 BPM
EKG DIAGNOSIS: NORMAL
EKG P AXIS: 90 DEGREES
EKG P-R INTERVAL: 178 MS
EKG Q-T INTERVAL: 418 MS
EKG QRS DURATION: 158 MS
EKG QTC CALCULATION (BAZETT): 482 MS
EKG R AXIS: 128 DEGREES
EKG T AXIS: -39 DEGREES
EKG VENTRICULAR RATE: 80 BPM

## 2023-08-24 PROCEDURE — 2500000003 HC RX 250 WO HCPCS

## 2023-08-24 PROCEDURE — 93010 ELECTROCARDIOGRAM REPORT: CPT | Performed by: INTERNAL MEDICINE

## 2023-08-24 PROCEDURE — 93000 ELECTROCARDIOGRAM COMPLETE: CPT | Performed by: INTERNAL MEDICINE

## 2023-08-24 PROCEDURE — 92960 CARDIOVERSION ELECTRIC EXT: CPT

## 2023-08-24 PROCEDURE — 3074F SYST BP LT 130 MM HG: CPT | Performed by: INTERNAL MEDICINE

## 2023-08-24 PROCEDURE — 92960 CARDIOVERSION ELECTRIC EXT: CPT | Performed by: INTERNAL MEDICINE

## 2023-08-24 PROCEDURE — 7100000010 HC PHASE II RECOVERY - FIRST 15 MIN

## 2023-08-24 PROCEDURE — 3078F DIAST BP <80 MM HG: CPT | Performed by: INTERNAL MEDICINE

## 2023-08-24 PROCEDURE — 93005 ELECTROCARDIOGRAM TRACING: CPT | Performed by: INTERNAL MEDICINE

## 2023-08-24 PROCEDURE — 99215 OFFICE O/P EST HI 40 MIN: CPT | Performed by: INTERNAL MEDICINE

## 2023-08-24 RX ORDER — PREDNISONE 20 MG/1
TABLET ORAL
COMMUNITY
Start: 2023-08-23

## 2023-08-24 RX ORDER — LORAZEPAM 1 MG/1
TABLET ORAL
COMMUNITY
Start: 2023-08-23

## 2023-08-24 NOTE — H&P
Cardioversion H & P    I just saw patient in office earlier this am.  Please see office progress note.

## 2023-08-24 NOTE — PATIENT INSTRUCTIONS
Go to Registration and get checked in, Dr Jairo Smith will do Cardioversion today at noon     Follow up in 3 months   Follow up with Allergist   Increase Metoprolol to 25mg twice daily     Call for any questions or concerns.

## 2023-08-24 NOTE — PROCEDURES
Cardioversion procedure note:       Informed consent explained to patient and consent form signed by patient. Pads placed in appropraite position, site confirmed   Time-out performed prior to initiation of procedure.        Presenting rhyhm: atrial flutter with variable block    Sedation: 50 mg IV methohexital pushed by Dr. Gwen Vega   # of attempts: 1 - 200 Joule shock       Results: successful conversion to atrially paced rhythm with occasional PVCs      Plan  Continue Eliquis  Increase metoprolol tartrate to 25 mg po BID  Avioid metoprolol succinate and lasix until she is seen by allergist next week    RTC in 3 months with Dr. Gwen Vega
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
## 2023-08-24 NOTE — PROGRESS NOTES
CATH LAB PROCEDURE LOG - CARDIOVERSION      PRE PROCEDURE    DATE: 8/24/2023 ARRIVAL TO CATH LAB: 11:46 AM    ID & ALLERGY BAND: On    CONSENT: Yes    NPO SINCE: Midnight    IV SITE: Started in 1150. Pt arrived to Cath Lab. Plan of Care: Hemodynamics and cardiac rhythm will remain stable. Comfort level will be maintained. Respiratory function will remain adequate. Pt/family will verbalize understanding of the procedure. Procedure will be tolerated without complications. Patient will recover from procedure without complications. ID armband on patient and identification verified. Informed consent obtained. Non invasive blood pressure cuff applied, monitoring initiated. EKG pads and pulse oximeter applied, monitoring initiated. Instructions given. Patient and / or family verbalize understanding. H&P will be documented by physician in [address] [place] [address] 15. Pt has been NPO since midnight. Post DCCV EKG ordered? Yes    Pre CV Rhythm: Atrial Fibrillation      CARDIOVERSION    Timeout and fire safety completed. TIMEOUT TIME: 12:16 PM    CORRECT PATIENT VERIFIED. MEMBERS OF THE SURGICAL TEAM/VISITORS INTRODUCED. ALLERGIES ANNOUNCED. CORRECT PROCEDURE VERIFIED. CORRECT PROCEDURAL SITE VERIFIED. CONSENTS VERIFIED. IMPLANT EQUIPMENT, ADDITIONAL SERVICES, SPECIAL REQUIREMENTS AVAILABLE. MEDICATIONS LABELED AND AVAILABLE. APPROPRIATE PRE MEDS HAVE BEEN ADMINISTERED. FIRE SAFETY: ALCOHOL PREP SOLUTION HAD SUFFICIENT TIME TO DISSIPATE IF USED. FIRE SAFETY: SURGICAL SITE OR INCISION ABOVE THE XIPHOID. YES=1, NO=0. FIRE SAFETY: OPEN OXYGEN SOURCE. YES=1, NO=0. FIRE SAFETY: AVAILABLE IGNITION SOURCE. YES=1, NO=0. FIRE SAFETY: SCORE TOTAL = 1.     12:16 PM - Brevital 50 mg IV given by Dr Wojciech Bradley performed at 200 joules  12:18 PM Rhythm was converted to normal sinus rhythm, paced.          POST CV Rhythm: Normal Sinus Rhythm      12:20 PM Pt waking up, respirations spontaneous, able to converse

## 2023-08-24 NOTE — DISCHARGE INSTRUCTIONS
CARDIOVERSION DISCHARGE INSTRUCTIONS    No driving for 24 hours. We strongly recommend that a responsible adult stay with you for the next 24 hours. Continue Eliquis    Hydrocortisone 1% cream to reddened areas as needed.        Phone: [phone]

## 2023-08-31 ENCOUNTER — TELEPHONE (OUTPATIENT)
Dept: CARDIOLOGY CLINIC | Age: 48
End: 2023-08-31

## 2023-08-31 NOTE — TELEPHONE ENCOUNTER
FMLA forms for  was dropped off at pts appointment with [address] on 8/24/23. Forms have been completed, signed by provider and faxed (confirmation receipt received) to number listed on form. Pt notified and verbalized understanding.   Copy mailed to pts home address

## 2023-10-24 ENCOUNTER — PATIENT MESSAGE (OUTPATIENT)
Dept: CARDIOLOGY CLINIC | Age: 48
End: 2023-10-24

## 2023-10-24 NOTE — TELEPHONE ENCOUNTER
From: Hollie Salas  To: Dr. Jean Manual: 10/24/2023 12:24 AM EDT  Subject: Rhythm     Hello,     My heart rhythm seems to go into a weird rhythm possibly V-pacing ever since 10/19 especially after I take Lopressor. I am wondering if I am out of sinus rhythm. I had a stressful week last week, had more coffee than I probably should have and not enough water. It seems ok but at times it is a weird rhythm and I feel lightheaded and fatigued. Please call me at [phone].    Thank you,     Kary Frey

## 2023-10-24 NOTE — TELEPHONE ENCOUNTER
We received remote transmission from patient's monitor at home. Transmission shows normal sensing and pacing function. Patient is currently in AF since ~ 10/19/2023. EP will review. See interrogation in [facility] or [facility] for more details. Please advise.  Thanks

## 2023-11-08 ENCOUNTER — TELEPHONE (OUTPATIENT)
Dept: CARDIOLOGY CLINIC | Age: 48
End: 2023-11-08

## 2023-11-08 NOTE — TELEPHONE ENCOUNTER
Spoke with the pt and got her scheduled for patient. We went over instructions  below and she verbalized understanding.      Procedure - CV   Date: 11/10/2023  Arrival time: 9:30 am   Procedure time: 10:30 am      Patient Instructions  Dx:Persistent AF                     ICD-10 code: I48.19   Do not eat or drink after midnight the night prior to procedure [x] Yes [] No    Qgenda updated / added in epic  / emailed cath lab

## 2023-11-10 ENCOUNTER — HOSPITAL ENCOUNTER (OUTPATIENT)
Dept: CARDIAC CATH/INVASIVE PROCEDURES | Age: 48
Discharge: HOME | End: 2023-11-10
Attending: INTERNAL MEDICINE | Admitting: INTERNAL MEDICINE
Payer: COMMERCIAL

## 2023-11-10 VITALS
TEMPERATURE: 98.1 F | HEIGHT: 61 IN | WEIGHT: 139 LBS | RESPIRATION RATE: 16 BRPM | HEART RATE: 70 BPM | SYSTOLIC BLOOD PRESSURE: 104 MMHG | BODY MASS INDEX: 26.24 KG/M2 | OXYGEN SATURATION: 98 % | DIASTOLIC BLOOD PRESSURE: 59 MMHG

## 2023-11-10 PROCEDURE — 92960 CARDIOVERSION ELECTRIC EXT: CPT

## 2023-11-10 NOTE — H&P
[facility]   Electrophysiology      CC: Aflutter   HPI: Harinder Stone is a 52 y.o. female h/o Ebstein anomaly,Open heart surgery x 4,  Cadaver pulmonic valve replacement 1999, tricuspid valve replacement 2009. Referred by PCP for persistent atrial fibrillation. Family history of complex congenital Heart Disease. She is here for a second opinion. 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 Selective venography of lower extremity, bilateral and left upper extremity    Interval History:   Her prior surname is Rj. [id number]. Miranda Herrera presents to the office in follow up. She states that she felt like she was out of rhythm yesterday. She is in atrial flutter per ECG today. States that she has been aware of a heart rate between  bpm for the past month. Has been increasing her activity and has been speed walking with her mom. 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 - ECG 06/13/2022 - atrial fibrillation rate 59    - continue Lopressor 12.5 Mg but increase to twice a day   - Patient has a [id number] Score of 1  ( gender )     ~ continue Warfarin   -Discussed treatment options including doing a DCCV vs completing another ablation. Recurrent after ablation      Congential Heart Disease/ Valve replacement/LV dysfunction   - s/p pulmonic and valve replacement   - Ebstein anomaly     Her LV function is reduced. EF is 41%. I reviewed all her echoes and I think more or less the EF has remained about the same with a slight variation depending on the rate at the time of the study. Due to low blood pressure, management of LV dysfunction has been difficult.     HTN  -Controlled 104/80  -BP goal

## 2023-11-10 NOTE — PROGRESS NOTES
CATH LAB PROCEDURE LOG - CARDIOVERSION      PRE PROCEDURE    DATE: 11/10/2023 ARRIVAL TO CATH LAB: 10:04 AM    ID & ALLERGY BAND: On    CONSENT: Yes    NPO SINCE: Midnight    IV SITE: Started in left arm. Pt arrived to Cath Lab. Plan of Care: Hemodynamics and cardiac rhythm will remain stable. Comfort level will be maintained. Respiratory function will remain adequate. Pt/family will verbalize understanding of the procedure. Procedure will be tolerated without complications. Patient will recover from procedure without complications. ID armband on patient and identification verified. Informed consent obtained. Non invasive blood pressure cuff applied, monitoring initiated. EKG pads and pulse oximeter applied, monitoring initiated. Instructions given. Patient and / or family verbalize understanding. H&P will be documented by physician in [address] [place] [address]. Pt has been NPO since midnight. Post DCCV EKG ordered? Yes    Pre CV Rhythm: Atrial Flutter      CARDIOVERSION    Timeout and fire safety completed. TIMEOUT TIME: 10:25 AM    CORRECT PATIENT VERIFIED. MEMBERS OF THE SURGICAL TEAM/VISITORS INTRODUCED. ALLERGIES ANNOUNCED. CORRECT PROCEDURE VERIFIED. CORRECT PROCEDURAL SITE VERIFIED. CONSENTS VERIFIED. IMPLANT EQUIPMENT, ADDITIONAL SERVICES, SPECIAL REQUIREMENTS AVAILABLE. MEDICATIONS LABELED AND AVAILABLE. APPROPRIATE PRE MEDS HAVE BEEN ADMINISTERED. FIRE SAFETY: ALCOHOL PREP SOLUTION HAD SUFFICIENT TIME TO DISSIPATE IF USED. FIRE SAFETY: SURGICAL SITE OR INCISION ABOVE THE XIPHOID. YES=1, NO=0. FIRE SAFETY: OPEN OXYGEN SOURCE. YES=1, NO=0. FIRE SAFETY: AVAILABLE IGNITION SOURCE. YES=1, NO=0. FIRE SAFETY: SCORE TOTAL = 1.      Procedure Medication:     10:25 AM - Brevital 40 mg IV given by Dr Austin Steele  10:26 AM - Brevital 20mg IV given by Dr. Jose Giles performed at 200 joules  Rhythm was converted to Normal Sinus Rhythm    10:31 AM Pt waking up, respirations spontaneous, able to

## 2023-11-10 NOTE — PROCEDURES
[facility]     Electrophysiology Procedure Note       Date of Procedure: 11/10/2023  Patient's Name: Ham Aguillon  YOB: 1975   Medical Record Number: [id number]  Procedure Performed by: Glenis Rey MD    Procedures performed:  IV sedation. External Electrical cardioversion   [id number]  ASA 3    Indication of the procedure: Persistent   atrial flutter      Details of procedure: The patient was brought to the cath lab area in a fasting and non-sedated state. The risks, benefits and alternatives of the procedure were discussed with the patient. The patient opted to proceed with the procedure. Written informed consent was signed and placed in the chart. A timeout protocol was completed to identify the patient and the procedure being performed. I pushed 40+20 mg Brevital.   We monitored the patient's level of consciousness and vital signs/physiologic status throughout the procedure duration (see start and stop times below). Sedation:     Sedation start: 1020  Sedation stop: 1036     Patient is on chronic anticoagulation therapy. Then we used Brevital for sedation and electrical DC cardioversion was perfomred using 200J, synchronized shock. Patient was converted to sinus rhythm at 60 bpm. The patient tolerated the procedure well and there were no complications. Conclusion:   Successful external DC cardioversion of atrial  flutter      Plan:   The patient can be discharged if remains stable. Will continue with medical therapy.

## 2023-11-11 LAB
EKG ATRIAL RATE: 102 BPM
EKG ATRIAL RATE: 80 BPM
EKG DIAGNOSIS: NORMAL
EKG DIAGNOSIS: NORMAL
EKG P AXIS: 55 DEGREES
EKG P-R INTERVAL: 178 MS
EKG Q-T INTERVAL: 388 MS
EKG Q-T INTERVAL: 476 MS
EKG QRS DURATION: 158 MS
EKG QRS DURATION: 164 MS
EKG QTC CALCULATION (BAZETT): 495 MS
EKG QTC CALCULATION (BAZETT): 548 MS
EKG R AXIS: 130 DEGREES
EKG R AXIS: 79 DEGREES
EKG T AXIS: -24 DEGREES
EKG T AXIS: 181 DEGREES
EKG VENTRICULAR RATE: 80 BPM
EKG VENTRICULAR RATE: 98 BPM

## 2023-12-07 PROCEDURE — 93296 REM INTERROG EVL PM/IDS: CPT | Performed by: INTERNAL MEDICINE

## 2023-12-07 PROCEDURE — 93294 REM INTERROG EVL PM/LDLS PM: CPT | Performed by: INTERNAL MEDICINE

## 2023-12-12 ENCOUNTER — TELEPHONE (OUTPATIENT)
Dept: CARDIOLOGY CLINIC | Age: 48
End: 2023-12-12

## 2023-12-12 NOTE — TELEPHONE ENCOUNTER
Spoke with the pt and got her scheduled for procedure. We went over instructions below and she verbalized understanding.      Procedure - CV   Date: 12/20/2023  Arrival time: 10:30 am  Procedure time: 11:30 am      Patient Instructions  Dx:PAF                        ICD-10 code: I48     Do not eat or drink after midnight the night prior to procedure [x] Yes [] No    Qgenda updated / Added in epic / emailed cath lab

## 2024-03-25 PROBLEM — R60.9 EDEMA: Status: RESOLVED | Noted: 2022-06-16 | Resolved: 2024-03-25

## 2024-03-25 NOTE — PROGRESS NOTES
[facility]   Electrophysiology  CARRILLO Sosa-CNP  Attending EP: Dr. Yeboah    Date: 4/24/2024  I had the privilege of visiting Keke Zhou in the office.     Chief Complaint:   Chief Complaint   Patient presents with    New Patient     No Cardiac Symptoms    Device Check     History of Present Illness: History obtained from patient and medical record.    Keke Zhou is 49 y.o. female with a past medical history of Ebstein anomaly, Open heart surgery x 4, Cadaver pulmonic valve replacement (1999), tricuspid valve replacement (2009). S/p Pulm valve replacement, [facility] repair of Ebstein's anomaly and maze (7/20/222 in [place]). 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 S/p RFCA of scar dependent atrial flutter (8/7/23). She was found to have junctional rhythm during procedure requiring PPM implant (8/9/23). S/p DCCV (11/10/23, 12/20/23)    -Interval history: Today, Keke Zhou is being seen for routine follow up. She is doing well and denies any symptoms. She is back in atrial tach/flutter. She feels great and states she actually feels better now than she does after her cardioversions. She is no longer on eliquis and is on baby aspirin daily.     Denies having chest pain, palpitations, shortness of breath, orthopnea/PND, cough, or dizziness at the time of this visit.    With regard to medication therapy the patient has been compliant with prescribed regimen. They have tolerated therapy to date.     Allergies:  Allergies   Allergen Reactions    Compazine [Prochlorperazine] Other (See Comments)     Hallucinations     Jardiance [Empagliflozin] Hives and Anxiety    Sulfa Antibiotics Hives    Quinidine Sulfate Headaches, Nausea And Vomiting and Photosensitivity       Home Medications:  Prior to Visit Medications    Medication Sig

## 2024-04-24 ENCOUNTER — OFFICE VISIT (OUTPATIENT)
Dept: CARDIOLOGY CLINIC | Age: 49
End: 2024-04-24
Payer: COMMERCIAL

## 2024-04-24 ENCOUNTER — NURSE ONLY (OUTPATIENT)
Dept: CARDIOLOGY CLINIC | Age: 49
End: 2024-04-24
Payer: COMMERCIAL

## 2024-04-24 VITALS
HEART RATE: 87 BPM | DIASTOLIC BLOOD PRESSURE: 68 MMHG | OXYGEN SATURATION: 98 % | WEIGHT: 138.2 LBS | SYSTOLIC BLOOD PRESSURE: 126 MMHG | HEIGHT: 61 IN | BODY MASS INDEX: 26.09 KG/M2

## 2024-04-24 DIAGNOSIS — I48.19 PERSISTENT ATRIAL FIBRILLATION WITH RVR (HCC): Primary | ICD-10-CM

## 2024-04-24 DIAGNOSIS — I49.5 SICK SINUS SYNDROME (HCC): ICD-10-CM

## 2024-04-24 DIAGNOSIS — I48.19 PERSISTENT ATRIAL FIBRILLATION WITH RVR (HCC): ICD-10-CM

## 2024-04-24 DIAGNOSIS — I10 BENIGN ESSENTIAL HTN: ICD-10-CM

## 2024-04-24 DIAGNOSIS — I48.4 ATYPICAL ATRIAL FLUTTER (HCC): ICD-10-CM

## 2024-04-24 DIAGNOSIS — I50.22 CHRONIC SYSTOLIC HEART FAILURE (HCC): ICD-10-CM

## 2024-04-24 DIAGNOSIS — Z95.0 PACEMAKER: ICD-10-CM

## 2024-04-24 DIAGNOSIS — I48.3 TYPICAL ATRIAL FLUTTER (HCC): ICD-10-CM

## 2024-04-24 DIAGNOSIS — Z95.0 PACEMAKER: Primary | ICD-10-CM

## 2024-04-24 DIAGNOSIS — Q22.5 EBSTEIN ANOMALY: ICD-10-CM

## 2024-04-24 PROCEDURE — 99214 OFFICE O/P EST MOD 30 MIN: CPT | Performed by: NURSE PRACTITIONER

## 2024-04-24 PROCEDURE — 3078F DIAST BP <80 MM HG: CPT | Performed by: NURSE PRACTITIONER

## 2024-04-24 PROCEDURE — 3074F SYST BP LT 130 MM HG: CPT | Performed by: NURSE PRACTITIONER

## 2024-04-24 PROCEDURE — 93000 ELECTROCARDIOGRAM COMPLETE: CPT | Performed by: NURSE PRACTITIONER

## 2024-04-24 RX ORDER — ASPIRIN 81 MG/1
81 TABLET ORAL DAILY
Qty: 30 TABLET | Refills: 3
Start: 2024-04-24

## 2024-04-25 PROCEDURE — 93280 PM DEVICE PROGR EVAL DUAL: CPT | Performed by: INTERNAL MEDICINE

## 2025-01-22 ENCOUNTER — TELEPHONE (OUTPATIENT)
Dept: CARDIOLOGY CLINIC | Age: 50
End: 2025-01-22

## 2025-01-22 NOTE — TELEPHONE ENCOUNTER
Kimber called to get info on the pt pacemaker.  Pt appt is 01/23.  Called the info to [employer] to handle.  Thank you      Make  Model

## 2025-03-31 ENCOUNTER — PATIENT MESSAGE (OUTPATIENT)
Dept: CARDIOLOGY CLINIC | Age: 50
End: 2025-03-31

## 2025-04-07 DIAGNOSIS — I48.3 TYPICAL ATRIAL FLUTTER (HCC): Primary | ICD-10-CM
